# Patient Record
Sex: FEMALE | Race: ASIAN | NOT HISPANIC OR LATINO | Employment: FULL TIME | URBAN - METROPOLITAN AREA
[De-identification: names, ages, dates, MRNs, and addresses within clinical notes are randomized per-mention and may not be internally consistent; named-entity substitution may affect disease eponyms.]

---

## 2017-01-06 DIAGNOSIS — R73.9 HYPERGLYCEMIA: ICD-10-CM

## 2017-01-06 DIAGNOSIS — E78.5 DYSLIPIDEMIA: ICD-10-CM

## 2017-01-06 DIAGNOSIS — R53.83 OTHER FATIGUE: ICD-10-CM

## 2017-02-02 ENCOUNTER — HOSPITAL ENCOUNTER (OUTPATIENT)
Dept: LAB | Facility: MEDICAL CENTER | Age: 47
End: 2017-02-02
Attending: FAMILY MEDICINE
Payer: COMMERCIAL

## 2017-02-02 DIAGNOSIS — E78.5 DYSLIPIDEMIA: ICD-10-CM

## 2017-02-02 DIAGNOSIS — R73.9 HYPERGLYCEMIA: ICD-10-CM

## 2017-02-02 DIAGNOSIS — R53.83 OTHER FATIGUE: ICD-10-CM

## 2017-02-02 LAB
25(OH)D3 SERPL-MCNC: 23 NG/ML (ref 30–100)
ALBUMIN SERPL BCP-MCNC: 4.4 G/DL (ref 3.2–4.9)
ALBUMIN/GLOB SERPL: 1.1 G/DL
ALP SERPL-CCNC: 97 U/L (ref 30–99)
ALT SERPL-CCNC: 49 U/L (ref 2–50)
ANION GAP SERPL CALC-SCNC: 9 MMOL/L (ref 0–11.9)
APPEARANCE UR: CLEAR
AST SERPL-CCNC: 29 U/L (ref 12–45)
BACTERIA #/AREA URNS HPF: ABNORMAL /HPF
BASOPHILS # BLD AUTO: 0.11 K/UL (ref 0–0.12)
BASOPHILS NFR BLD AUTO: 1 % (ref 0–1.8)
BILIRUB SERPL-MCNC: 0.4 MG/DL (ref 0.1–1.5)
BILIRUB UR QL STRIP.AUTO: NEGATIVE
BUN SERPL-MCNC: 10 MG/DL (ref 8–22)
CALCIUM SERPL-MCNC: 9.8 MG/DL (ref 8.5–10.5)
CHLORIDE SERPL-SCNC: 103 MMOL/L (ref 96–112)
CHOLEST SERPL-MCNC: 135 MG/DL (ref 100–199)
CO2 SERPL-SCNC: 24 MMOL/L (ref 20–33)
COLOR UR AUTO: ABNORMAL
CREAT SERPL-MCNC: 0.92 MG/DL (ref 0.5–1.4)
CREAT UR-MCNC: 75.3 MG/DL
CULTURE IF INDICATED INDCX: NO UA CULTURE
EOSINOPHIL # BLD: 0.14 K/UL (ref 0–0.51)
EOSINOPHIL NFR BLD AUTO: 1.2 % (ref 0–6.9)
EPITHELIAL CELLS 1715: ABNORMAL /HPF
ERYTHROCYTE [DISTWIDTH] IN BLOOD BY AUTOMATED COUNT: 40.3 FL (ref 35.9–50)
GLOBULIN SER CALC-MCNC: 3.9 G/DL (ref 1.9–3.5)
GLUCOSE SERPL-MCNC: 177 MG/DL (ref 65–99)
GLUCOSE UR STRIP.AUTO-MCNC: NEGATIVE MG/DL
HCT VFR BLD AUTO: 43 % (ref 37–47)
HDLC SERPL-MCNC: 33 MG/DL
HGB BLD-MCNC: 13 G/DL (ref 12–16)
IMM GRANULOCYTES # BLD AUTO: 0.01 K/UL (ref 0–0.11)
IMM GRANULOCYTES NFR BLD AUTO: 0.1 % (ref 0–0.9)
KETONES UR STRIP.AUTO-MCNC: NEGATIVE MG/DL
LDLC SERPL CALC-MCNC: 82 MG/DL
LEUKOCYTE ESTERASE UR QL STRIP.AUTO: NEGATIVE
LYMPHOCYTES # BLD: 4.27 K/UL (ref 1–4.8)
LYMPHOCYTES NFR BLD AUTO: 37.7 % (ref 22–41)
MCH RBC QN AUTO: 26.3 PG (ref 27–33)
MCHC RBC AUTO-ENTMCNC: 30.2 G/DL (ref 33.6–35)
MCV RBC AUTO: 86.9 FL (ref 81.4–97.8)
MICRO URNS: ABNORMAL
MICROALBUMIN UR-MCNC: <0.7 MG/DL
MICROALBUMIN/CREAT UR: NORMAL MG/G (ref 0–30)
MONOCYTES # BLD: 0.49 K/UL (ref 0–0.85)
MONOCYTES NFR BLD AUTO: 4.3 % (ref 0–13.4)
NEUTROPHILS # BLD: 6.31 K/UL (ref 2–7.15)
NEUTROPHILS NFR BLD AUTO: 55.7 % (ref 44–72)
NITRITE UR QL STRIP.AUTO: NEGATIVE
NRBC # BLD AUTO: 0 K/UL
NRBC BLD-RTO: 0 /100 WBC
PH UR: 6 [PH]
PLATELET # BLD AUTO: 279 K/UL (ref 164–446)
PMV BLD AUTO: 12.3 FL (ref 9–12.9)
POTASSIUM SERPL-SCNC: 4.1 MMOL/L (ref 3.6–5.5)
PROT SERPL-MCNC: 8.3 G/DL (ref 6–8.2)
PROT UR QL STRIP: NEGATIVE MG/DL
RBC # BLD AUTO: 4.95 M/UL (ref 4.2–5.4)
RBC #/AREA URNS HPF: ABNORMAL /HPF
RBC UR QL AUTO: ABNORMAL
SODIUM SERPL-SCNC: 136 MMOL/L (ref 135–145)
SP GR UR STRIP.AUTO: 1.01
T4 FREE SERPL-MCNC: 0.96 NG/DL (ref 0.53–1.43)
TRIGL SERPL-MCNC: 98 MG/DL (ref 0–149)
TSH SERPL DL<=0.005 MIU/L-ACNC: 1.37 UIU/ML (ref 0.3–3.7)
WBC # BLD AUTO: 11.3 K/UL (ref 4.8–10.8)
WBC #/AREA URNS HPF: ABNORMAL /HPF

## 2017-02-02 PROCEDURE — 81001 URINALYSIS AUTO W/SCOPE: CPT

## 2017-02-02 PROCEDURE — 36415 COLL VENOUS BLD VENIPUNCTURE: CPT

## 2017-02-02 PROCEDURE — 80061 LIPID PANEL: CPT

## 2017-02-02 PROCEDURE — 82570 ASSAY OF URINE CREATININE: CPT

## 2017-02-02 PROCEDURE — 84443 ASSAY THYROID STIM HORMONE: CPT

## 2017-02-02 PROCEDURE — 82306 VITAMIN D 25 HYDROXY: CPT

## 2017-02-02 PROCEDURE — 82043 UR ALBUMIN QUANTITATIVE: CPT

## 2017-02-02 PROCEDURE — 84439 ASSAY OF FREE THYROXINE: CPT

## 2017-02-02 PROCEDURE — 80053 COMPREHEN METABOLIC PANEL: CPT

## 2017-02-02 PROCEDURE — 85025 COMPLETE CBC W/AUTO DIFF WBC: CPT

## 2017-02-08 ENCOUNTER — OFFICE VISIT (OUTPATIENT)
Dept: MEDICAL GROUP | Facility: PHYSICIAN GROUP | Age: 47
End: 2017-02-08
Payer: COMMERCIAL

## 2017-02-08 VITALS
DIASTOLIC BLOOD PRESSURE: 88 MMHG | RESPIRATION RATE: 14 BRPM | BODY MASS INDEX: 30.21 KG/M2 | SYSTOLIC BLOOD PRESSURE: 132 MMHG | HEIGHT: 61 IN | WEIGHT: 160 LBS | HEART RATE: 97 BPM | OXYGEN SATURATION: 99 % | TEMPERATURE: 99 F

## 2017-02-08 DIAGNOSIS — E11.9 DIABETES MELLITUS WITHOUT COMPLICATION (HCC): ICD-10-CM

## 2017-02-08 DIAGNOSIS — Z23 NEEDS FLU SHOT: ICD-10-CM

## 2017-02-08 DIAGNOSIS — J30.1 SEASONAL ALLERGIC RHINITIS DUE TO POLLEN: ICD-10-CM

## 2017-02-08 DIAGNOSIS — E66.9 OBESITY (BMI 30.0-34.9): ICD-10-CM

## 2017-02-08 DIAGNOSIS — E78.5 DYSLIPIDEMIA: ICD-10-CM

## 2017-02-08 PROCEDURE — 99396 PREV VISIT EST AGE 40-64: CPT | Mod: 25 | Performed by: FAMILY MEDICINE

## 2017-02-08 PROCEDURE — 90686 IIV4 VACC NO PRSV 0.5 ML IM: CPT | Performed by: FAMILY MEDICINE

## 2017-02-08 PROCEDURE — 90471 IMMUNIZATION ADMIN: CPT | Performed by: FAMILY MEDICINE

## 2017-02-08 ASSESSMENT — PATIENT HEALTH QUESTIONNAIRE - PHQ9: CLINICAL INTERPRETATION OF PHQ2 SCORE: 0

## 2017-02-08 NOTE — PROGRESS NOTES
Patient comes in to review her recent lab work. She says she is feeling tired.  She is due for flu shot.  She denies chest pains or shortness of breath.    I reviewed the following    Past Medical History   Diagnosis Date   • GDM (gestational diabetes mellitus)    • Diabetes mellitus, type 2 (CMS-HCC) 3/14        History reviewed. No pertinent past surgical history.    No Known Allergies    Current Outpatient Prescriptions   Medication Sig Dispense Refill   • metformin (GLUCOPHAGE) 500 MG Tab Take 1 Tab by mouth 2 times a day, with meals. 60 Tab 3   • atorvastatin (LIPITOR) 10 MG Tab TAKE ONE TABLET BY MOUTH ONE TIME DAILY 90 Tab 1   • etodolac (LODINE) 400 MG tablet Take 1 Tab by mouth 2 times a day. Take with food for menstrual cramps 30 Tab 3     No current facility-administered medications for this visit.        Family History   Problem Relation Age of Onset   • Diabetes Mother    • Diabetes Father    • Heart Disease Father    • Hyperlipidemia Father    • Stroke Father    • Hypertension Father    • Diabetes Brother    • Diabetes Brother    • Thyroid Sister        Social History     Social History   • Marital Status:      Spouse Name: N/A   • Number of Children: 3   • Years of Education: HS     Occupational History   •  [employer]     Social History Main Topics   • Smoking status: Never Smoker    • Smokeless tobacco: Never Used   • Alcohol Use: No   • Drug Use: No   • Sexual Activity:     Partners: Male     Birth Control/ Protection: Surgical      Comment:  with vasectomy     Other Topics Concern   • Not on file     Social History Narrative          Hospital Outpatient Visit on 02/02/2017   Component Date Value   • TSH 02/02/2017 1.370    • Free T-4 02/02/2017 0.96    • Micro Urine Req 02/02/2017 Microscopic    • Color 02/02/2017 Lt. Yellow    • Character 02/02/2017 Clear    • Specific Gravity 02/02/2017 1.009    • Ph 02/02/2017 6.0    • Glucose 02/02/2017 Negative    • Ketones  02/02/2017 Negative    • Protein 02/02/2017 Negative    • Bilirubin 02/02/2017 Negative    • Nitrite 02/02/2017 Negative    • Leukocyte Esterase 02/02/2017 Negative    • Occult Blood 02/02/2017 Trace*   • Culture Indicated 02/02/2017 No    • WBC 02/02/2017 11.3*   • RBC 02/02/2017 4.95    • Hemoglobin 02/02/2017 13.0    • Hematocrit 02/02/2017 43.0    • MCV 02/02/2017 86.9    • MCH 02/02/2017 26.3*   • MCHC 02/02/2017 30.2*   • RDW 02/02/2017 40.3    • Platelet Count 02/02/2017 279    • MPV 02/02/2017 12.3    • Neutrophils-Polys 02/02/2017 55.70    • Lymphocytes 02/02/2017 37.70    • Monocytes 02/02/2017 4.30    • Eosinophils 02/02/2017 1.20    • Basophils 02/02/2017 1.00    • Immature Granulocytes 02/02/2017 0.10    • Nucleated RBC 02/02/2017 0.00    • Neutrophils (Absolute) 02/02/2017 6.31    • Lymphs (Absolute) 02/02/2017 4.27    • Monos (Absolute) 02/02/2017 0.49    • Eos (Absolute) 02/02/2017 0.14    • Baso (Absolute) 02/02/2017 0.11    • Immature Granulocytes (a* 02/02/2017 0.01    • NRBC (Absolute) 02/02/2017 0.00    • Sodium 02/02/2017 136    • Potassium 02/02/2017 4.1    • Chloride 02/02/2017 103    • Co2 02/02/2017 24    • Anion Gap 02/02/2017 9.0    • Glucose 02/02/2017 177*   • Bun 02/02/2017 10    • Creatinine 02/02/2017 0.92    • Calcium 02/02/2017 9.8    • AST(SGOT) 02/02/2017 29    • ALT(SGPT) 02/02/2017 49    • Alkaline Phosphatase 02/02/2017 97    • Total Bilirubin 02/02/2017 0.4    • Albumin 02/02/2017 4.4    • Total Protein 02/02/2017 8.3*   • Globulin 02/02/2017 3.9*   • A-G Ratio 02/02/2017 1.1    • Cholesterol,Tot 02/02/2017 135    • Triglycerides 02/02/2017 98    • HDL 02/02/2017 33*   • LDL 02/02/2017 82    • 25-Hydroxy   Vitamin D 25 02/02/2017 23*   • Creatinine, Urine 02/02/2017 75.30    • Microalbumin, Urine Rand* 02/02/2017 <0.7    • Micro Alb Creat Ratio 02/02/2017 see below    • WBC 02/02/2017 0-2    • RBC 02/02/2017 0-2    • Bacteria 02/02/2017 Few*   • Epithelial Cells 02/02/2017  Few    • GFR If  02/02/2017 >60    • GFR If Non  Ameri* 02/02/2017 >60      Physical Exam   Constitutional: She is oriented. She appears well-developed and obese. No distress.   HENT:  Head: Normocephalic and atraumatic.   Right Ear: External ear normal. Ear canal and TM normal   Left Ear: External ear normal. Ear canal and TM normal  Nose: Nose normal.   Mouth/Throat: Oropharynx is clear and moist.   Eyes: Conjunctivae and extraocular motions are normal. Pupils are equal, round, and reactive to light. Fundi benign bilaterally   Neck: No thyromegaly present.   Cardiovascular: Normal rate, regular rhythm, normal heart sounds and intact distal pulses.  Exam reveals no gallop.    No murmur heard.  Pulmonary/Chest: Effort normal and breath sounds normal. No respiratory distress. She has no wheezes. She has no rales.   Abdominal: Soft. Bowel sounds are normal. No hepatosplenomegaly She exhibits no distension. No tenderness. She has no rebound and no guarding.   Musculoskeletal: Normal range of motion. She exhibits no edema and no tenderness.   Lymphadenopathy:     She has no cervical adenopathy.   No supraclavicular adenopathy  Neurological: She is alert and oriented. She has normal reflexes.        Babinskis downgoing bilaterally --The patient has intact sensation to monofilament light touch over both feet. No sores or ulcers are noted over the feet.    Skin: Skin is warm and dry. No rash noted. No erythema.   Psychiatric: She has a normal mood and appropriate affect. Her behavior is normal. Judgment and thought content normal.    1. Diabetes mellitus without complication (CMS-[facility])  metformin (GLUCOPHAGE) 500 MG Tab--begin one by mouth twice a day with food   I recommended that the patient go to diabetes education but she says she has been through that before in the past apparently. She is willing to go to a dietitian, however.     Diabetic Monofilament LE Exam   2. Dyslipidemia   doing well    3.  Seasonal allergic rhinitis due to pollen   quiescent    4. Obesity (BMI 30.0-34.9)  REFERRAL TO [facility] IMPROVEMENT PROGRAMS (HIP) Services Requested:: Registered Dietitian for Medical Nutrition Therapy; Reason for Visit:: Overweight/Obesity   5. Needs flu shot  INFLUENZA VACCINE QUAD INJ >3Y(PF)     Recheck with me one month    Please note that this dictation was created using voice recognition software. I have worked with consultants from the vendor as well as technical experts from [facility] to optimize the interface. I have made every reasonable attempt to correct obvious errors, but I expect that there are errors of grammar and possibly content that I did not discover before finalizing the note.

## 2017-02-08 NOTE — PATIENT INSTRUCTIONS
Patient given written instructions regarding labs, medications, referrals, dietary and lifestyle management, and return visit.    Wil Rich MD

## 2017-02-08 NOTE — MR AVS SNAPSHOT
"Ayaka Torres Rai   2017 9:00 AM   Office Visit   MRN: [id number]    Department:  [facility]   Dept Phone:  [phone]    Description:  Female : 1970   Provider:  Wil Rich M.D.           Reason for Visit     Annual Exam           Allergies as of 2017     No Known Allergies      You were diagnosed with     Diabetes mellitus without complication (CMS-[facility])   [408213]       Dyslipidemia   [551026]       Seasonal allergic rhinitis due to pollen   [8887102]       Obesity (BMI 30.0-34.9)   [180670]       Needs flu shot   [667961]         Vital Signs     Blood Pressure Pulse Temperature Respirations Height Weight    132/88 mmHg 97 37.2 °C (99 °F) 14 1.549 m (5' 0.98\") 72.576 kg (160 lb)    Body Mass Index Oxygen Saturation Smoking Status             30.25 kg/m2 99% Never Smoker          Basic Information     Date Of Birth Sex Race Ethnicity Preferred Language    1970 Female  Non- English      Problem List              ICD-10-CM Priority Class Noted - Resolved    Diabetes mellitus, type 2 (CMS-HCC) E11.9   Unknown - Present    Dyslipidemia E78.5   3/12/2014 - Present    Allergic rhinitis due to allergen J30.9   3/12/2014 - Present      Health Maintenance        Date Due Completion Dates    IMM HEP B VACCINE (1 of 3 - Primary Series) 1970 ---    RETINAL SCREENING 1988 ---    A1C SCREENING 2015, 3/5/2014, 2010    MAMMOGRAM 2016 (Declined), 12/15/2010    Override on 2015: Patient Declined    DIABETES MONOFILAMENT / LE EXAM 2016 (Done), 3/12/2014 (Done)    Override on 2015: Done    Override on 3/12/2014: Done    IMM INFLUENZA (1) 2016, 2015, 10/1/2010    IMM DTaP/Tdap/Td Vaccine (2 - Td) 2017    PAP SMEAR 2018, 2015 (Done), 2010    Override on 2015: Done    FASTING LIPID PROFILE 2018, 2015, 3/4/2014, 2010    URINE ACR / " MICROALBUMIN 2/2/2018 2/2/2017, 1/13/2015    SERUM CREATININE 2/2/2018 2/2/2017, 1/13/2015, 3/4/2014, 12/2/2010, 7/13/2006, 6/29/2005, 6/28/2005            Current Immunizations     Influenza Vaccine Pediatric 10/1/2010    Influenza Vaccine Quad Inj (Pf)  Incomplete    Influenza Vaccine Quad Inj (Preserved) 9/24/2015, 1/26/2015    Pneumococcal polysaccharide vaccine (PPSV-23) 1/26/2015    Tdap Vaccine 1/1/2007      Below and/or attached are the medications your provider expects you to take. Review all of your home medications and newly ordered medications with your provider and/or pharmacist. Follow medication instructions as directed by your provider and/or pharmacist. Please keep your medication list with you and share with your provider. Update the information when medications are discontinued, doses are changed, or new medications (including over-the-counter products) are added; and carry medication information at all times in the event of emergency situations     Allergies:  No Known Allergies          Medications  Valid as of: February 08, 2017 - 10:00 AM    Generic Name Brand Name Tablet Size Instructions for use    Atorvastatin Calcium (Tab) LIPITOR 10 MG TAKE ONE TABLET BY MOUTH ONE TIME DAILY        Etodolac (Tab) LODINE 400 MG Take 1 Tab by mouth 2 times a day. Take with food for menstrual cramps        MetFORMIN HCl (Tab) GLUCOPHAGE 500 MG Take 1 Tab by mouth 2 times a day, with meals.        .                 Medicines prescribed today were sent to:     [place] [address] [place] [id number] [facility] [address]    Phone: [phone] Fax: [phone]    Open 24 Hours?: No      Medication refill instructions:       If your prescription bottle indicates you have medication refills left, it is not necessary to call your provider’s office. Please contact your pharmacy and they will refill your medication.    If your prescription bottle indicates you do not have any refills  left, you may request refills at any time through one of the following ways: The online crowdSPRING system (except Urgent Care), by calling your provider’s office, or by asking your pharmacy to contact your provider’s office with a refill request. Medication refills are processed only during regular business hours and may not be available until the next business day. Your provider may request additional information or to have a follow-up visit with you prior to refilling your medication.   *Please Note: Medication refills are assigned a new Rx number when refilled electronically. Your pharmacy may indicate that no refills were authorized even though a new prescription for the same medication is available at the pharmacy. Please request the medicine by name with the pharmacy before contacting your provider for a refill.        Referral     A referral request has been sent to our patient care coordination department. Please allow 3-5 business days for us to process this request and contact you either by phone or mail. If you do not hear from us by the 5th business day, please call us at [phone].           crowdSPRING Access Code: [email or web address]  Expires: 3/10/2017 10:00 AM    crowdSPRING  A secure, online tool to manage your health information     [employer]’s crowdSPRING® is a secure, online tool that connects you to your personalized health information from the privacy of your home -- day or night - making it very easy for you to manage your healthcare. Once the activation process is completed, you can even access your medical information using the crowdSPRING petra, which is available for free in the Apple Petra store or Google Play store.     crowdSPRING provides the following levels of access (as shown below):   My Chart Features   [facility] Doctor [facility]  Specialists [facility]  Urgent  Care Non-[facility]  Primary Care  Doctor   Email your healthcare team securely and privately 24/7 X X X    Manage appointments: schedule  your next appointment; view details of past/upcoming appointments X      Request prescription refills. X      View recent personal medical records, including lab and immunizations X X X X   View health record, including health history, allergies, medications X X X X   Read reports about your outpatient visits, procedures, consult and ER notes X X X X   See your discharge summary, which is a recap of your hospital and/or ER visit that includes your diagnosis, lab results, and care plan. X X       How to register for 2nd Watch:  1. Go to  [email or web address].  2. Click on the Sign Up Now box, which takes you to the New Member Sign Up page. You will need to provide the following information:  a. Enter your 2nd Watch Access Code exactly as it appears at the top of this page. (You will not need to use this code after you’ve completed the sign-up process. If you do not sign up before the expiration date, you must request a new code.)   b. Enter your date of birth.   c. Enter your home email address.   d. Click Submit, and follow the next screen’s instructions.  3. Create a 2nd Watch ID. This will be your 2nd Watch login ID and cannot be changed, so think of one that is secure and easy to remember.  4. Create a 2nd Watch password. You can change your password at any time.  5. Enter your Password Reset Question and Answer. This can be used at a later time if you forget your password.   6. Enter your e-mail address. This allows you to receive e-mail notifications when new information is available in 2nd Watch.  7. Click Sign Up. You can now view your health information.    For assistance activating your 2nd Watch account, call [phone]

## 2017-07-31 RX ORDER — ATORVASTATIN CALCIUM 10 MG/1
TABLET, FILM COATED ORAL
Qty: 90 TAB | Refills: 3 | Status: SHIPPED | OUTPATIENT
Start: 2017-07-31 | End: 2018-08-26 | Stop reason: SDUPTHER

## 2017-08-14 ENCOUNTER — HOSPITAL ENCOUNTER (OUTPATIENT)
Dept: LAB | Facility: MEDICAL CENTER | Age: 47
End: 2017-08-14
Attending: FAMILY MEDICINE
Payer: COMMERCIAL

## 2017-08-14 DIAGNOSIS — E11.9 DIABETES MELLITUS WITHOUT COMPLICATION (HCC): ICD-10-CM

## 2017-08-14 DIAGNOSIS — E78.5 DYSLIPIDEMIA: ICD-10-CM

## 2017-08-14 DIAGNOSIS — R73.9 HYPERGLYCEMIA: ICD-10-CM

## 2017-08-14 LAB
EST. AVERAGE GLUCOSE BLD GHB EST-MCNC: 146 MG/DL
HBA1C MFR BLD: 6.7 % (ref 0–5.6)

## 2017-08-14 PROCEDURE — 85025 COMPLETE CBC W/AUTO DIFF WBC: CPT

## 2017-08-14 PROCEDURE — 83036 HEMOGLOBIN GLYCOSYLATED A1C: CPT

## 2017-08-14 PROCEDURE — 80061 LIPID PANEL: CPT

## 2017-08-14 PROCEDURE — 80053 COMPREHEN METABOLIC PANEL: CPT

## 2017-08-14 PROCEDURE — 36415 COLL VENOUS BLD VENIPUNCTURE: CPT

## 2017-08-15 ENCOUNTER — OFFICE VISIT (OUTPATIENT)
Dept: MEDICAL GROUP | Facility: PHYSICIAN GROUP | Age: 47
End: 2017-08-15
Payer: COMMERCIAL

## 2017-08-15 VITALS
SYSTOLIC BLOOD PRESSURE: 104 MMHG | DIASTOLIC BLOOD PRESSURE: 58 MMHG | HEIGHT: 61 IN | OXYGEN SATURATION: 98 % | TEMPERATURE: 97.7 F | WEIGHT: 150 LBS | HEART RATE: 94 BPM | BODY MASS INDEX: 28.32 KG/M2

## 2017-08-15 DIAGNOSIS — E78.5 DYSLIPIDEMIA: ICD-10-CM

## 2017-08-15 DIAGNOSIS — E11.9 DIABETES MELLITUS WITHOUT COMPLICATION (HCC): ICD-10-CM

## 2017-08-15 LAB
ALBUMIN SERPL BCP-MCNC: 4.3 G/DL (ref 3.2–4.9)
ALBUMIN/GLOB SERPL: 1.1 G/DL
ALP SERPL-CCNC: 84 U/L (ref 30–99)
ALT SERPL-CCNC: 26 U/L (ref 2–50)
ANION GAP SERPL CALC-SCNC: 11 MMOL/L (ref 0–11.9)
AST SERPL-CCNC: 29 U/L (ref 12–45)
BASOPHILS # BLD AUTO: 0.8 % (ref 0–1.8)
BASOPHILS # BLD: 0.1 K/UL (ref 0–0.12)
BILIRUB SERPL-MCNC: 0.5 MG/DL (ref 0.1–1.5)
BUN SERPL-MCNC: 13 MG/DL (ref 8–22)
CALCIUM SERPL-MCNC: 10.3 MG/DL (ref 8.5–10.5)
CHLORIDE SERPL-SCNC: 102 MMOL/L (ref 96–112)
CHOLEST SERPL-MCNC: 145 MG/DL (ref 100–199)
CO2 SERPL-SCNC: 24 MMOL/L (ref 20–33)
CREAT SERPL-MCNC: 0.79 MG/DL (ref 0.5–1.4)
EOSINOPHIL # BLD AUTO: 0.18 K/UL (ref 0–0.51)
EOSINOPHIL NFR BLD: 1.4 % (ref 0–6.9)
ERYTHROCYTE [DISTWIDTH] IN BLOOD BY AUTOMATED COUNT: 48.9 FL (ref 35.9–50)
GFR SERPL CREATININE-BSD FRML MDRD: >60 ML/MIN/1.73 M 2
GLOBULIN SER CALC-MCNC: 4 G/DL (ref 1.9–3.5)
GLUCOSE SERPL-MCNC: 59 MG/DL (ref 65–99)
HCT VFR BLD AUTO: 46.3 % (ref 37–47)
HDLC SERPL-MCNC: 41 MG/DL
HGB BLD-MCNC: 14.1 G/DL (ref 12–16)
IMM GRANULOCYTES # BLD AUTO: 0.03 K/UL (ref 0–0.11)
IMM GRANULOCYTES NFR BLD AUTO: 0.2 % (ref 0–0.9)
LDLC SERPL CALC-MCNC: 78 MG/DL
LYMPHOCYTES # BLD AUTO: 4.88 K/UL (ref 1–4.8)
LYMPHOCYTES NFR BLD: 36.9 % (ref 22–41)
MCH RBC QN AUTO: 28.3 PG (ref 27–33)
MCHC RBC AUTO-ENTMCNC: 30.5 G/DL (ref 33.6–35)
MCV RBC AUTO: 92.8 FL (ref 81.4–97.8)
MONOCYTES # BLD AUTO: 0.65 K/UL (ref 0–0.85)
MONOCYTES NFR BLD AUTO: 4.9 % (ref 0–13.4)
NEUTROPHILS # BLD AUTO: 7.4 K/UL (ref 2–7.15)
NEUTROPHILS NFR BLD: 55.8 % (ref 44–72)
NRBC # BLD AUTO: 0 K/UL
NRBC BLD AUTO-RTO: 0 /100 WBC
PLATELET # BLD AUTO: 336 K/UL (ref 164–446)
PMV BLD AUTO: 12.5 FL (ref 9–12.9)
POTASSIUM SERPL-SCNC: 4.2 MMOL/L (ref 3.6–5.5)
PROT SERPL-MCNC: 8.3 G/DL (ref 6–8.2)
RBC # BLD AUTO: 4.99 M/UL (ref 4.2–5.4)
SODIUM SERPL-SCNC: 137 MMOL/L (ref 135–145)
TRIGL SERPL-MCNC: 128 MG/DL (ref 0–149)
WBC # BLD AUTO: 13.2 K/UL (ref 4.8–10.8)

## 2017-08-15 PROCEDURE — 99214 OFFICE O/P EST MOD 30 MIN: CPT | Performed by: FAMILY MEDICINE

## 2017-08-16 NOTE — PROGRESS NOTES
Patient comes in to review her recent labs. She says she feels shaky sometimes in the late day when she has been walking a lot. 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 She has consequently lost 10 pounds.  She has no chest pains or shortness of breath.    I reviewed the following    Past Medical History   Diagnosis Date   • GDM (gestational diabetes mellitus)    • Diabetes mellitus, type 2 (CMS-[facility]) 3/14        History reviewed. No pertinent past surgical history.    No Known Allergies    Current Outpatient Prescriptions   Medication Sig Dispense Refill   • metformin (GLUCOPHAGE) 500 MG Tab TAKE 1/2 TABLET BY MOUTH AT NIGHT WITH FOOD 90 Tab 3   • Blood Glucose Monitoring Suppl Device Meter: Dispense One Touch Glucose Meter and all necessary supplies. Sig. Use daily for blood sugar monitoring. #1. NR. 1 Device 0   • atorvastatin (LIPITOR) 10 MG Tab TAKE ONE TABLET BY MOUTH ONE TIME DAILY 90 Tab 3   • etodolac (LODINE) 400 MG tablet Take 1 Tab by mouth 2 times a day. Take with food for menstrual cramps (Patient not taking: Reported on 8/15/2017) 30 Tab 3     No current facility-administered medications for this visit.        Family History   Problem Relation Age of Onset   • Diabetes Mother    • Diabetes Father    • Heart Disease Father    • Hyperlipidemia Father    • Stroke Father    • Hypertension Father    • Diabetes Brother    • Diabetes Brother    • Thyroid Sister        Social History     Social History   • Marital Status:      Spouse Name: N/A   • Number of Children: 3   • Years of Education: HS     Occupational History   •  [employer]     Social History Main Topics   • Smoking status: Never Smoker    • Smokeless tobacco: Never Used   • Alcohol Use: No   • Drug Use: No   • Sexual Activity:     Partners: Male     Birth Control/ Protection: Surgical      Comment:  with vasectomy     Other Topics Concern   • Not on file      Social History Narrative          Hospital Outpatient Visit on 08/14/2017   Component Date Value   • Glycohemoglobin 08/14/2017 6.7*   • Est Avg Glucose 08/14/2017 146    • Sodium 08/14/2017 137    • Potassium 08/14/2017 4.2    • Chloride 08/14/2017 102    • Co2 08/14/2017 24    • Anion Gap 08/14/2017 11.0    • Glucose 08/14/2017 59*   • Bun 08/14/2017 13    • Creatinine 08/14/2017 0.79    • Calcium 08/14/2017 10.3    • AST(SGOT) 08/14/2017 29    • ALT(SGPT) 08/14/2017 26    • Alkaline Phosphatase 08/14/2017 84    • Total Bilirubin 08/14/2017 0.5    • Albumin 08/14/2017 4.3    • Total Protein 08/14/2017 8.3*   • Globulin 08/14/2017 4.0*   • A-G Ratio 08/14/2017 1.1    • Cholesterol,Tot 08/14/2017 145    • Triglycerides 08/14/2017 128    • HDL 08/14/2017 41    • LDL 08/14/2017 78    • WBC 08/14/2017 13.2*   • RBC 08/14/2017 4.99    • Hemoglobin 08/14/2017 14.1    • Hematocrit 08/14/2017 46.3    • MCV 08/14/2017 92.8    • MCH 08/14/2017 28.3    • MCHC 08/14/2017 30.5*   • RDW 08/14/2017 48.9    • Platelet Count 08/14/2017 336    • MPV 08/14/2017 12.5    • Neutrophils-Polys 08/14/2017 55.80    • Lymphocytes 08/14/2017 36.90    • Monocytes 08/14/2017 4.90    • Eosinophils 08/14/2017 1.40    • Basophils 08/14/2017 0.80    • Immature Granulocytes 08/14/2017 0.20    • Nucleated RBC 08/14/2017 0.00    • Neutrophils (Absolute) 08/14/2017 7.40*   • Lymphs (Absolute) 08/14/2017 4.88*   • Monos (Absolute) 08/14/2017 0.65    • Eos (Absolute) 08/14/2017 0.18    • Baso (Absolute) 08/14/2017 0.10    • Immature Granulocytes (a* 08/14/2017 0.03    • NRBC (Absolute) 08/14/2017 0.00    • GFR If  08/14/2017 >60    • GFR If Non  Ameri* 08/14/2017 >60      Physical Exam   Constitutional: She is oriented. She appears well-developed and well-nourished. No distress.   HENT:  Head: Normocephalic and atraumatic.   Right Ear: External ear normal. Ear canal and TM normal   Left Ear: External ear normal. Ear canal and  TM normal  Nose: Nose normal.   Mouth/Throat: Oropharynx is clear and moist.   Eyes: Conjunctivae and extraocular motions are normal. Pupils are equal, round, and reactive to light. Fundi benign bilaterally   Neck: No thyromegaly present.   Cardiovascular: Normal rate, regular rhythm, normal heart sounds and intact distal pulses.  Exam reveals no gallop.    No murmur heard.  Pulmonary/Chest: Effort normal and breath sounds normal. No respiratory distress. She has no wheezes. She has no rales.   Abdominal: Soft. Bowel sounds are normal. No hepatosplenomegaly She exhibits no distension. No tenderness. She has no rebound and no guarding.   Musculoskeletal: Normal range of motion. She exhibits no edema and no tenderness.   Lymphadenopathy:     She has no cervical adenopathy.   No supraclavicular adenopathy  Neurological: She is alert and oriented. She has normal reflexes.        Babinskis downgoing bilaterally --The patient has intact sensation to monofilament light touch over both feet. No sores or ulcers are noted over the feet.    Skin: Skin is warm and dry. No rash noted. No erythema.   Psychiatric: She has a normal mood and appropriate affect. Her behavior is normal. Judgment and thought content normal.     1. Diabetes mellitus without complication (CMS-HCC) --control is a bit too tight especially since she has lost weight  metformin (GLUCOPHAGE) 500 MG Tab--decreased dose to one half tab at dinner time from her prior dose of one tab at dinner time     Blood Glucose Monitoring Suppl Device--glucose meter with all supplies     REFERRAL TO OPHTHALMOLOGY--Dr. Garduno     COMP METABOLIC PANEL    LIPID PROFILE    MICROALBUMIN CREAT RATIO URINE    HEMOGLOBIN A1C   2. Dyslipidemia  COMP METABOLIC PANEL    LIPID PROFILE     Recheck 2 months or when necessary    Please note that this dictation was created using voice recognition software. I have worked with consultants from the vendor as well as technical experts from  [facility] to optimize the interface. I have made every reasonable attempt to correct obvious errors, but I expect that there are errors of grammar and possibly content that I did not discover before finalizing the note.

## 2018-05-14 DIAGNOSIS — E11.9 DIABETES MELLITUS WITHOUT COMPLICATION (HCC): ICD-10-CM

## 2018-11-27 RX ORDER — ATORVASTATIN CALCIUM 10 MG/1
TABLET, FILM COATED ORAL
Qty: 90 TAB | Refills: 1 | Status: SHIPPED | OUTPATIENT
Start: 2018-11-27 | End: 2019-05-27 | Stop reason: SDUPTHER

## 2018-11-27 NOTE — TELEPHONE ENCOUNTER
Was the patient seen in the last year in this department? No 08/15/17    Does patient have an active prescription for medications requested? No     Received Request Via: Pharmacy

## 2019-02-11 ENCOUNTER — OFFICE VISIT (OUTPATIENT)
Dept: MEDICAL GROUP | Facility: PHYSICIAN GROUP | Age: 49
End: 2019-02-11
Payer: COMMERCIAL

## 2019-02-11 VITALS
HEIGHT: 61 IN | BODY MASS INDEX: 30.02 KG/M2 | RESPIRATION RATE: 12 BRPM | WEIGHT: 159 LBS | TEMPERATURE: 97.1 F | OXYGEN SATURATION: 98 % | HEART RATE: 87 BPM | DIASTOLIC BLOOD PRESSURE: 92 MMHG | SYSTOLIC BLOOD PRESSURE: 126 MMHG

## 2019-02-11 DIAGNOSIS — E11.9 TYPE 2 DIABETES MELLITUS WITHOUT COMPLICATION, WITHOUT LONG-TERM CURRENT USE OF INSULIN (HCC): ICD-10-CM

## 2019-02-11 DIAGNOSIS — E78.5 DYSLIPIDEMIA: ICD-10-CM

## 2019-02-11 DIAGNOSIS — E66.9 OBESITY (BMI 30-39.9): ICD-10-CM

## 2019-02-11 LAB
HBA1C MFR BLD: 8.3 % (ref ?–5.8)
INT CON NEG: NEGATIVE
INT CON POS: POSITIVE

## 2019-02-11 PROCEDURE — 92250 FUNDUS PHOTOGRAPHY W/I&R: CPT | Mod: TC | Performed by: NURSE PRACTITIONER

## 2019-02-11 PROCEDURE — 83036 HEMOGLOBIN GLYCOSYLATED A1C: CPT | Performed by: NURSE PRACTITIONER

## 2019-02-11 PROCEDURE — 99214 OFFICE O/P EST MOD 30 MIN: CPT | Performed by: NURSE PRACTITIONER

## 2019-02-11 NOTE — ASSESSMENT & PLAN NOTE
This is a chronic problem for the patient that is uncontrolled.  The patient's weight today is 159 pounds with a BMI of 30.04. The patient states that she has recently started exercising with doing elliptical and high intensity interval training every other day for the last week.  Patient states that her starting weight 1 week ago was 163 pounds and she is down to 159 today.  Patient reports that her diet is vegetarian and she gets her protein from almonds, peanuts, homemade cheese.  Patient states that with dieting she has been skipping meals, but does continue to have large portions.

## 2019-02-11 NOTE — PROGRESS NOTES
"CC:   Chief Complaint   Patient presents with   • Diabetes     diabetes check, tired, readings are high this morning 140        HISTORY OF THE PRESENT ILLNESS: Patient is a 48 y.o. female. This pleasant patient is here today to discuss high blood sugars.    Health Maintenance: Reviewed, patient declines Pap smear, mammogram at this time.  Diabetes health maintenance including retinal screening and hemoglobin A1c completed in clinic today.  Orders for microalbumin creatinine ratio urine, fasting lipid profile, BMP given to patient to complete prior to follow up with PCP.    Diabetes mellitus, type 2  This is a chronic problem for the patient that is uncontrolled.  The patient is presenting today with concerns of her fasting morning blood sugar in the 140-150 range.  Patient states that her previous PCP never prescribed her any antidiabetic medication and stated that she was \"borderline diabetic\".  The patient's next PCP prescribed her metformin, which she has been taking for about 2 years.  For the last 6 months the patient was feeling symptoms of hypoglycemia while working.  She notified her PCP and was advised to decrease her metformin 500 mg nightly to 250 mg nightly.  The patient states that while working she is walking 6-7 miles per day and is not eating for the majority of the day.  The patient has since been taking metformin 250 mg nightly and is concerned that her fasting blood sugars are still elevated.  1 week ago the patient started an exercise program in addition to her 6-7 miles she walks per day for work, which includes every other day elliptical and high intensity interval training.  Patient states that when she is exercising and fasting she is symptomatic for low blood sugar.  The patient has not checked her blood sugar when she has felt the symptoms.  The patient's hemoglobin A1c today is 8.3.    Dyslipidemia  This is a chronic problem for the patient that is controlled with atorvastatin 10 mg/day, " denies any side effects of the medication.  The patient's most recent lipid profile was completed on 8/14/2017.  Lab Results  Component Value Date/Time   CHOLSTRLTOT 145 08/14/2017 0658   TRIGLYCERIDE 128 08/14/2017 0658   HDL 41 08/14/2017 0658   LDL 78 08/14/2017 0658       Obesity (BMI 30-39.9)  This is a chronic problem for the patient that is uncontrolled.  The patient's weight today is 159 pounds with a BMI of 30.04. The patient states that she has recently started exercising with doing elliptical and high intensity interval training every other day for the last week.  Patient states that her starting weight 1 week ago was 163 pounds and she is down to 159 today.  Patient reports that her diet is vegetarian and she gets her protein from almonds, peanuts, homemade cheese.  Patient states that with dieting she has been skipping meals, but does continue to have large portions.    Allergies: Patient has no known allergies.    Current Outpatient Prescriptions Ordered in [facility]   Medication Sig Dispense Refill   • atorvastatin (LIPITOR) 10 MG Tab TAKE 1 TABLET BY MOUTH EVERY DAY 90 Tab 1   • metFORMIN (GLUCOPHAGE) 500 MG Tab TAKE 1/2 TABLET EVERY EVENING WITH FOOD 45 Tab 3   • ONE TOUCH ULTRA TEST strip USE ONE STRIP DAILY FOR BLOOD SUGAR MONITORING 50 Strip 3   • Blood Glucose Monitoring Suppl Device Meter: Dispense One Touch Glucose Meter and all necessary supplies. Sig. Use daily for blood sugar monitoring. #1. NR. 1 Device 0   • etodolac (LODINE) 400 MG tablet Take 1 Tab by mouth 2 times a day. Take with food for menstrual cramps 30 Tab 3     No current Epic-ordered facility-administered medications on file.        Past Medical History:   Diagnosis Date   • Allergic rhinitis due to allergen 3/12/2014   • Diabetes mellitus, type 2 (HCC) 3/14   • GDM (gestational diabetes mellitus)    • Hyperlipidemia        History reviewed. No pertinent surgical history.    Social History   Substance Use Topics   • Smoking status:  Never Smoker   • Smokeless tobacco: Never Used   • Alcohol use No       Social History     Social History Narrative   • No narrative on file       Family History   Problem Relation Age of Onset   • Diabetes Father    • Heart Disease Father    • Hyperlipidemia Father    • Stroke Father    • Hypertension Father    • Diabetes Mother    • Diabetes Brother    • Diabetes Brother    • Thyroid Sister        ROS:   Constitutional: Positive for intermittent sweating, shaking, lightheadedness.  Negative for fever, chills, unexpected weight change, night sweats, body aches, sleep issues,  and fatigue/generalized weakness.   HEENT:  Negative for headaches, vision changes, hearing changes, ear pain, tinnitus, ear discharge, rhinorrhea, sinus congestion, sneezing, sore throat, and neck pain.    Respiratory:  Negative for cough, shortness of breath, sputum production, hemoptysis, chest congestion, dyspnea, wheezing, and crackles.    Cardiovascular:  Negative for chest pain, palpitations, LIND, paroxsymal nocturnal dyspnea, orthopnea, and bilateral lower extremity edema.   Gastrointestinal:  Negative for heartburn, nausea, vomiting, abdominal pain, hematochezia, melena, diarrhea, constipation, and greasy/foul-smelling stools.   Genitourinary:  Negative for dysuria, nocturia, polyuria, hematuria, pyuria, urinary urgency, urinary frequency, and urinary incontinence.   Musculoskeletal:  Negative for myalgias, back pain, and joint pain.   Skin: Positive for abrasion on left knee, patient states slow to heal, but healing.  Negative for rash, lumps, itching, cyanotic skin color change.   Neurological: Positive for intermittent dizziness with assumed hypoglycemia.  Negative for tingling, tremors, focal sensory deficit, focal weakness and headaches.   Endo/Heme/Allergies:  Does not bruise/bleed easily. Denies cold/heat intolerance.   Psychiatric/Behavioral: Negative for depression, suicidal/homicidal ideation and memory loss.        Exam:  "Blood pressure 126/92, pulse 87, temperature 36.2 °C (97.1 °F), resp. rate 12, height 1.549 m (5' 1\"), weight 72.1 kg (159 lb), SpO2 98 %. Body mass index is 30.04 kg/m².    General:  Normal appearing. No distress.  HEENT:  Normocephalic. Eyes conjunctiva clear lids without ptosis, pupils equal and reactive to light accommodation, ears normal shape and contour.   Neck:  Supple without JVD or bruit.   Pulmonary:  Clear to ausculation.  Normal effort. No rales, ronchi, or wheezing.  Cardiovascular:  Regular rate and rhythm without murmur.   Abdomen:  Soft, nontender, nondistended. Normal bowel sounds.   Neurologic:  Grossly nonfocal.  Skin:  Warm and dry.  No obvious lesions.  Musculoskeletal:  Normal gait. No extremity cyanosis, clubbing, or edema.  Psych: Normal mood and affect. Alert and oriented x3. Judgment and insight is normal.    Assessment/Plan  1. Type 2 diabetes mellitus without complication, without long-term current use of insulin ([facility])  POCT A1C - 8.3%  POCT Retinal Eye Exam - completed in clinic    Lipid Profile -to be completed prior to follow-up  COMP METABOLIC PANEL -to be completed prior to follow-up    MICROALBUMIN CREAT RATIO URINE (LAB COLLECT) -to be completed prior to follow-up    REFERRAL TO ENDOCRINOLOGY -patient states that she has received diabetic education in the past.  Referral to endocrinology for management of diabetes as patient is having hypoglycemia symptoms while only taking 250 mg of metformin nightly, and worse symptoms when taking 500 mg nightly.  Plan to have the patient follow-up with diabetes RN in clinic with PCP on 3/18/2019.   2. Dyslipidemia  Lipid Profile -to be completed prior to follow-up  Continue atorvastatin 10 mg nightly   3. Obesity (BMI 30-39.9)  Patient identified as having weight management issue.  Appropriate orders and counseling given.  Recommend that the patient include more protein in her diet as she is a vegetarian.  Also recommended that the patient have " small frequent meals throughout the day, for example 6 small meals instead of 1-2 large meals per day.     Educated in proper administration of medication(s) ordered today including safety, possible SE, risks, benefits, rationale and alternatives to therapy.   Supportive care, differential diagnoses, and indications for immediate follow-up discussed with patient.    Pathogenesis of diagnosis discussed including typical length and natural progression.    Instructed to return to clinic or nearest emergency department for any change in condition, further concerns, or worsening of symptoms.  Patient states understanding of the plan of care and discharge instructions.    Return in about 5 weeks (around 3/18/2019) for Diabetes RN/Provider Combo.    Please note that this dictation was created using voice recognition software. I have made every reasonable attempt to correct obvious errors, but I expect that there are errors of grammar and possibly content that I did not discover before finalizing the note.

## 2019-02-11 NOTE — ASSESSMENT & PLAN NOTE
This is a chronic problem for the patient that is controlled with atorvastatin 10 mg/day, denies any side effects of the medication.  The patient's most recent lipid profile was completed on 8/14/2017.  Lab Results  Component Value Date/Time   CHOLSTRLTOT 145 08/14/2017 0658   TRIGLYCERIDE 128 08/14/2017 0658   HDL 41 08/14/2017 0658   LDL 78 08/14/2017 0658

## 2019-02-11 NOTE — ASSESSMENT & PLAN NOTE
"This is a chronic problem for the patient that is uncontrolled.  The patient is presenting today with concerns of her fasting morning blood sugar in the 140-150 range.  Patient states that her previous PCP never prescribed her any antidiabetic medication and stated that she was \"borderline diabetic\".  The patient's next PCP prescribed her metformin, which she has been taking for about 2 years.  For the last 6 months the patient was feeling symptoms of hypoglycemia while working.  She notified her PCP and was advised to decrease her metformin 500 mg nightly to 250 mg nightly.  The patient states that while working she is walking 6-7 miles per day and is not eating for the majority of the day.  The patient has since been taking metformin 250 mg nightly and is concerned that her fasting blood sugars are still elevated.  1 week ago the patient started an exercise program in addition to her 6-7 miles she walks per day for work, which includes every other day elliptical and high intensity interval training.  Patient states that when she is exercising and fasting she is symptomatic for low blood sugar.  The patient has not checked her blood sugar when she has felt the symptoms.  The patient's hemoglobin A1c today is 8.3.  "

## 2019-02-12 ENCOUNTER — HOSPITAL ENCOUNTER (OUTPATIENT)
Dept: LAB | Facility: MEDICAL CENTER | Age: 49
End: 2019-02-12
Attending: NURSE PRACTITIONER
Payer: COMMERCIAL

## 2019-02-12 DIAGNOSIS — E11.9 TYPE 2 DIABETES MELLITUS WITHOUT COMPLICATION, WITHOUT LONG-TERM CURRENT USE OF INSULIN (HCC): ICD-10-CM

## 2019-02-12 LAB
ALBUMIN SERPL BCP-MCNC: 4.7 G/DL (ref 3.2–4.9)
ALBUMIN/GLOB SERPL: 1.2 G/DL
ALP SERPL-CCNC: 96 U/L (ref 30–99)
ALT SERPL-CCNC: 80 U/L (ref 2–50)
ANION GAP SERPL CALC-SCNC: 10 MMOL/L (ref 0–11.9)
AST SERPL-CCNC: 53 U/L (ref 12–45)
BILIRUB SERPL-MCNC: 0.5 MG/DL (ref 0.1–1.5)
BUN SERPL-MCNC: 10 MG/DL (ref 8–22)
CALCIUM SERPL-MCNC: 10.9 MG/DL (ref 8.5–10.5)
CHLORIDE SERPL-SCNC: 104 MMOL/L (ref 96–112)
CHOLEST SERPL-MCNC: 130 MG/DL (ref 100–199)
CO2 SERPL-SCNC: 25 MMOL/L (ref 20–33)
CREAT SERPL-MCNC: 0.82 MG/DL (ref 0.5–1.4)
CREAT UR-MCNC: 35.2 MG/DL
FASTING STATUS PATIENT QL REPORTED: NORMAL
GLOBULIN SER CALC-MCNC: 3.8 G/DL (ref 1.9–3.5)
GLUCOSE SERPL-MCNC: 168 MG/DL (ref 65–99)
HDLC SERPL-MCNC: 35 MG/DL
LDLC SERPL CALC-MCNC: 74 MG/DL
MICROALBUMIN UR-MCNC: <0.7 MG/DL
MICROALBUMIN/CREAT UR: NORMAL MG/G (ref 0–30)
POTASSIUM SERPL-SCNC: 4.3 MMOL/L (ref 3.6–5.5)
PROT SERPL-MCNC: 8.5 G/DL (ref 6–8.2)
SODIUM SERPL-SCNC: 139 MMOL/L (ref 135–145)
TRIGL SERPL-MCNC: 105 MG/DL (ref 0–149)

## 2019-02-12 PROCEDURE — 82043 UR ALBUMIN QUANTITATIVE: CPT

## 2019-02-12 PROCEDURE — 80053 COMPREHEN METABOLIC PANEL: CPT

## 2019-02-12 PROCEDURE — 36415 COLL VENOUS BLD VENIPUNCTURE: CPT

## 2019-02-12 PROCEDURE — 82570 ASSAY OF URINE CREATININE: CPT

## 2019-02-12 PROCEDURE — 80061 LIPID PANEL: CPT

## 2019-02-19 LAB — RETINAL SCREEN: NEGATIVE

## 2019-04-08 ENCOUNTER — OFFICE VISIT (OUTPATIENT)
Dept: ENDOCRINOLOGY | Facility: MEDICAL CENTER | Age: 49
End: 2019-04-08
Payer: COMMERCIAL

## 2019-04-08 VITALS
DIASTOLIC BLOOD PRESSURE: 78 MMHG | SYSTOLIC BLOOD PRESSURE: 120 MMHG | HEART RATE: 85 BPM | BODY MASS INDEX: 29.07 KG/M2 | WEIGHT: 154 LBS | HEIGHT: 61 IN | OXYGEN SATURATION: 100 %

## 2019-04-08 DIAGNOSIS — E11.00 TYPE 2 DIABETES MELLITUS WITH HYPEROSMOLARITY WITHOUT COMA, WITH LONG-TERM CURRENT USE OF INSULIN (HCC): ICD-10-CM

## 2019-04-08 DIAGNOSIS — Z79.4 TYPE 2 DIABETES MELLITUS WITH HYPEROSMOLARITY WITHOUT COMA, WITH LONG-TERM CURRENT USE OF INSULIN (HCC): ICD-10-CM

## 2019-04-08 DIAGNOSIS — E11.65 UNCONTROLLED TYPE 2 DIABETES MELLITUS WITH HYPERGLYCEMIA (HCC): ICD-10-CM

## 2019-04-08 PROCEDURE — 99203 OFFICE O/P NEW LOW 30 MIN: CPT | Performed by: PHYSICIAN ASSISTANT

## 2019-04-08 NOTE — PROGRESS NOTES
New Patient Consult Note  Referred by: Wil Rich M.D.    Reason for consult: Diabetes Management Type 2    HPI:  Ayaka Torres Rai is a 48 y.o. old patient who is seeing us today for diabetes care.  This is a pleasant patient with diabetes and I appreciate the opportunity to participate in the care of this patient.  This is a new patient with me today.    Labs of  2/11/19 HbA1c is 8.3, GFR >60  Labs of 8/14/18 HbA1c is 6.7    BG Diary:4/8/2019  In the AM: 133, 130, 139, 142, 146, 132, 138, 146    Has been Diabetic since 5 years  Has a Glucagon pen at home: no    1. Type 2 diabetes mellitus with hyperosmolarity without coma, with long-term current use of insulin (HCC)  This is a new patient with me on 4/8/2019  They are on:  1.  Metformin 500mg IR  One in the AM one in the PM          ROS:   Constitutional: No change in weight , No fatigue, No night sweats.  HEENT: No Headache.  Eyes:  No blurred vision, No visual changes.  Cardiac: No chest pain, No palpitations.  Resp: No shortness of breath, No cough,   Gastro: No nausea or vomiting, No diarrhea.  Neuro: Denies numbness or tinging in bilateral feet or hands, and no loss of sensation.  Endo: No heat or cold intolerance.  : No polyuria, No polydipsia, No chronic UTI's.  Lower extremities: No lower leg edema bilateral.  All other systems were reviewed and were negative.    Past Medical History:  Patient Active Problem List    Diagnosis Date Noted   • Uncontrolled type 2 diabetes mellitus with hyperglycemia (HCC) 04/08/2019   • Obesity (BMI 30-39.9) 02/11/2019   • Dyslipidemia 03/12/2014       Past Surgical History:  History reviewed. No pertinent surgical history.    Allergies:  Patient has no known allergies.    Social History:  Social History     Social History   • Marital status:      Spouse name: N/A   • Number of children: 3   • Years of education: HS     Occupational History   •  [employer]     Social History Main  "Topics   • Smoking status: Never Smoker   • Smokeless tobacco: Never Used   • Alcohol use No   • Drug use: No   • Sexual activity: Yes     Partners: Male     Birth control/ protection: Surgical      Comment:  with vasectomy     Other Topics Concern   • Not on file     Social History Narrative   • No narrative on file       Family History:  Family History   Problem Relation Age of Onset   • Diabetes Father    • Heart Disease Father    • Hyperlipidemia Father    • Stroke Father    • Hypertension Father    • Diabetes Mother    • Diabetes Brother    • Diabetes Brother    • Thyroid Sister        Medications:    Current Outpatient Prescriptions:   •  Empagliflozin-Metformin HCl ER  MG TABLET SR 24 HR, Take 1 tablet by mouth 1 time daily as needed., Disp: 30 Tab, Rfl: 11  •  atorvastatin (LIPITOR) 10 MG Tab, TAKE 1 TABLET BY MOUTH EVERY DAY, Disp: 90 Tab, Rfl: 1  •  ONE TOUCH ULTRA TEST strip, USE ONE STRIP DAILY FOR BLOOD SUGAR MONITORING, Disp: 50 Strip, Rfl: 3  •  Blood Glucose Monitoring Suppl Device, Meter: Dispense One Touch Glucose Meter and all necessary supplies. Sig. Use daily for blood sugar monitoring. #1. NR., Disp: 1 Device, Rfl: 0  •  etodolac (LODINE) 400 MG tablet, Take 1 Tab by mouth 2 times a day. Take with food for menstrual cramps, Disp: 30 Tab, Rfl: 3      Physical Examination:   Vital signs: /78 (BP Location: Left arm, Patient Position: Sitting)   Pulse 85   Ht 1.549 m (5' 1\")   Wt 69.9 kg (154 lb)   SpO2 100%   BMI 29.10 kg/m²   General: No distress, cooperative, well dressed and well nourished.   Eyes: No scleral icterus or discharge, No hyposphagma  ENMT: Normal on external inspection of nose, lips, No nasal drainage   Neck: No abnormal masses on inspection  Resp: Normal effort, Bilateral clear to auscultation, No wheezing, No rales  CVS: Regular rate and rhythm, S1 S2 normal, No murmur. No gallop  Extremities: No edema bilateral extremities  Neuro: Alert and " oriented  Skin: No rash, No Ulcers  Psych: Normal mood and affect      Assessment and Plan:    2. Uncontrolled type 2 diabetes mellitus with hyperglycemia (HCC)    1.  Synjardy 1000/25 one pill a day in the morning    Stop Metformin  The metformin alone is not holding her. I will add in an SGLT2      Return in about 2 months (around 6/8/2019).       This patient during there office visit was started on new medication Synjardy.  Side effects of new medications were discussed with the patient today in the office. The patient was supplied paperwork on this new medication.    Thank you kindly for allowing me to participate in the diabetes care plan for this patient.    John Mcfadden PA-C, BC-[facility]  Board Certified - Advanced Diabetes Management  04/08/19    CC:   Wil Rich M.D.

## 2019-06-03 ENCOUNTER — OFFICE VISIT (OUTPATIENT)
Dept: ENDOCRINOLOGY | Facility: MEDICAL CENTER | Age: 49
End: 2019-06-03
Payer: COMMERCIAL

## 2019-06-03 VITALS
HEIGHT: 61 IN | BODY MASS INDEX: 28.32 KG/M2 | OXYGEN SATURATION: 98 % | SYSTOLIC BLOOD PRESSURE: 100 MMHG | WEIGHT: 150 LBS | DIASTOLIC BLOOD PRESSURE: 50 MMHG | HEART RATE: 82 BPM

## 2019-06-03 DIAGNOSIS — Z79.4 TYPE 2 DIABETES MELLITUS WITH HYPEROSMOLARITY WITHOUT COMA, WITH LONG-TERM CURRENT USE OF INSULIN (HCC): ICD-10-CM

## 2019-06-03 DIAGNOSIS — E11.00 TYPE 2 DIABETES MELLITUS WITH HYPEROSMOLARITY WITHOUT COMA, WITH LONG-TERM CURRENT USE OF INSULIN (HCC): ICD-10-CM

## 2019-06-03 DIAGNOSIS — E11.65 UNCONTROLLED TYPE 2 DIABETES MELLITUS WITH HYPERGLYCEMIA (HCC): ICD-10-CM

## 2019-06-03 LAB
HBA1C MFR BLD: 6.3 % (ref 0–5.6)
INT CON NEG: NEGATIVE
INT CON POS: POSITIVE

## 2019-06-03 PROCEDURE — 83036 HEMOGLOBIN GLYCOSYLATED A1C: CPT | Performed by: PHYSICIAN ASSISTANT

## 2019-06-03 PROCEDURE — 99213 OFFICE O/P EST LOW 20 MIN: CPT | Performed by: PHYSICIAN ASSISTANT

## 2019-06-03 NOTE — PROGRESS NOTES
Return to office Patient Consult Note  Referred by: Wil Rich M.D.    Reason for consult: Diabetes Management Type 2    HPI:  Ayaka Torres Rai is a 48 y.o. old patient who is seeing us today for diabetes care.  This is a pleasant patient with diabetes and I appreciate the opportunity to participate in the care of this patient.    Labs of 6/3/2019 HbA1c is 6.3  Labs of  2/11/19 HbA1c is 8.3, GFR >60  Labs of 8/14/18 HbA1c is 6.7    BG Diary:6/3/2019  In the AM: 128, 112, 131, 112, 131, 108, 124, 122    Weight: on 4/8/19 was 154pounds and today  150      1. Uncontrolled type 2 diabetes mellitus with hyperglycemia (HCC)  This is a new patient with me on 4/8/2019  They were on:  1.  Metformin 500mg IR  One in the AM one in the PM    Now on:  1.  Synjardy 1000/25 one pill a day in the morning        ROS:   Constitutional: No night sweats.  Eyes:  No visual changes.  Cardiac: No chest pain, No palpitations or racing heart rate.  Resp: No shortness of breath, No cough,   Gi: No Diarrhea    All other systems were reviewed and were/are negative.      Past Medical History:  Patient Active Problem List    Diagnosis Date Noted   • Uncontrolled type 2 diabetes mellitus with hyperglycemia (HCC) 04/08/2019   • Obesity (BMI 30-39.9) 02/11/2019   • Dyslipidemia 03/12/2014       Past Surgical History:  History reviewed. No pertinent surgical history.    Allergies:  Patient has no known allergies.    Social History:  Social History     Social History   • Marital status:      Spouse name: N/A   • Number of children: 3   • Years of education: HS     Occupational History   •  [employer]     Social History Main Topics   • Smoking status: Never Smoker   • Smokeless tobacco: Never Used   • Alcohol use No   • Drug use: No   • Sexual activity: Yes     Partners: Male     Birth control/ protection: Surgical      Comment:  with vasectomy     Other Topics Concern   • Not on file     Social History  "Narrative   • No narrative on file       Family History:  Family History   Problem Relation Age of Onset   • Diabetes Father    • Heart Disease Father    • Hyperlipidemia Father    • Stroke Father    • Hypertension Father    • Diabetes Mother    • Diabetes Brother    • Diabetes Brother    • Thyroid Sister        Medications:    Current Outpatient Prescriptions:   •  atorvastatin (LIPITOR) 10 MG Tab, TAKE 1 TABLET BY MOUTH EVERY DAY, Disp: 90 Tab, Rfl: 3  •  Empagliflozin-Metformin HCl ER  MG TABLET SR 24 HR, Take 1 tablet by mouth 1 time daily as needed., Disp: 30 Tab, Rfl: 11  •  ONE TOUCH ULTRA TEST strip, USE ONE STRIP DAILY FOR BLOOD SUGAR MONITORING, Disp: 50 Strip, Rfl: 3  •  Blood Glucose Monitoring Suppl Device, Meter: Dispense One Touch Glucose Meter and all necessary supplies. Sig. Use daily for blood sugar monitoring. #1. NR., Disp: 1 Device, Rfl: 0  •  etodolac (LODINE) 400 MG tablet, Take 1 Tab by mouth 2 times a day. Take with food for menstrual cramps, Disp: 30 Tab, Rfl: 3        Physical Examination:   Vital signs: /50 (BP Location: Left arm, Patient Position: Sitting)   Pulse 82   Ht 1.549 m (5' 1\")   Wt 68 kg (150 lb)   SpO2 98%   BMI 28.34 kg/m²   General: No distress, cooperative, well dressed and well nourished.   Eyes: No scleral icterus or discharge, No hyposphagma  ENMT: Normal on external inspection of nose, lips, No nasal drainage   Neck: No abnormal masses on inspection  Resp: Normal effort, Bilateral clear to auscultation, No wheezing, No rales  CVS: Regular rate and rhythm, S1 S2 normal, No murmur. No gallop  Extremities: No edema bilateral extremities  Neuro: Alert and oriented  Skin: No rash, No Ulcers  Psych: Normal mood and affect      Assessment and Plan:    1. Uncontrolled type 2 diabetes mellitus with hyperglycemia (HCC)  Now on:  1.  Synjardy 1000/25 one pill a day in the morning     Return in about 1 year (around 6/3/2020).      Thank you kindly for allowing me " to participate in the diabetes care plan for this patient.    John Mcfadden PA-C, BC-[facility]  Board Certified - Advanced Diabetes Management  06/03/19    CC:   Wil Rich M.D.

## 2020-02-25 ENCOUNTER — TELEPHONE (OUTPATIENT)
Dept: MEDICAL GROUP | Facility: PHYSICIAN GROUP | Age: 50
End: 2020-02-25

## 2020-02-25 DIAGNOSIS — E11.65 UNCONTROLLED TYPE 2 DIABETES MELLITUS WITH HYPERGLYCEMIA (HCC): ICD-10-CM

## 2020-02-25 DIAGNOSIS — E55.9 VITAMIN D DEFICIENCY: ICD-10-CM

## 2020-02-25 DIAGNOSIS — E78.5 DYSLIPIDEMIA: ICD-10-CM

## 2020-02-26 NOTE — TELEPHONE ENCOUNTER
Phone Number Called: [phone] (home)       Call outcome: Spoke to patient regarding message below.    Message: pt informed labs ordered may go to any [facility] lab fasting

## 2020-02-26 NOTE — TELEPHONE ENCOUNTER
VOICEMAIL  1. Caller Name: Gorge GROVER Rai                        Call Back Number: [phone] (home)       2. Message: Pt requesting lab orders be placed for upcoming appt with you       3. Patient approves office to leave a detailed voicemail/MyChart message: N\A

## 2020-05-20 ENCOUNTER — HOSPITAL ENCOUNTER (OUTPATIENT)
Dept: LAB | Facility: MEDICAL CENTER | Age: 50
End: 2020-05-20
Attending: FAMILY MEDICINE
Payer: COMMERCIAL

## 2020-05-20 ENCOUNTER — HOSPITAL ENCOUNTER (OUTPATIENT)
Facility: MEDICAL CENTER | Age: 50
End: 2020-05-20
Payer: COMMERCIAL

## 2020-05-20 DIAGNOSIS — E78.5 DYSLIPIDEMIA: ICD-10-CM

## 2020-05-20 DIAGNOSIS — E11.65 UNCONTROLLED TYPE 2 DIABETES MELLITUS WITH HYPERGLYCEMIA (HCC): ICD-10-CM

## 2020-05-20 LAB
25(OH)D3 SERPL-MCNC: 54 NG/ML (ref 30–100)
ALBUMIN SERPL BCP-MCNC: 4.4 G/DL (ref 3.2–4.9)
ALBUMIN/GLOB SERPL: 1.3 G/DL
ALP SERPL-CCNC: 89 U/L (ref 30–99)
ALT SERPL-CCNC: 29 U/L (ref 2–50)
ANION GAP SERPL CALC-SCNC: 12 MMOL/L (ref 7–16)
APPEARANCE UR: ABNORMAL
AST SERPL-CCNC: 29 U/L (ref 12–45)
BACTERIA #/AREA URNS HPF: ABNORMAL /HPF
BASOPHILS # BLD AUTO: 0.8 % (ref 0–1.8)
BASOPHILS # BLD: 0.08 K/UL (ref 0–0.12)
BILIRUB SERPL-MCNC: 0.5 MG/DL (ref 0.1–1.5)
BILIRUB UR QL STRIP.AUTO: NEGATIVE
BUN SERPL-MCNC: 11 MG/DL (ref 8–22)
CALCIUM SERPL-MCNC: 10.2 MG/DL (ref 8.5–10.5)
CHLORIDE SERPL-SCNC: 104 MMOL/L (ref 96–112)
CHOLEST SERPL-MCNC: 125 MG/DL (ref 100–199)
CO2 SERPL-SCNC: 25 MMOL/L (ref 20–33)
COLOR UR: YELLOW
CREAT SERPL-MCNC: 0.68 MG/DL (ref 0.5–1.4)
CREAT UR-MCNC: 53.48 MG/DL
EOSINOPHIL # BLD AUTO: 0.13 K/UL (ref 0–0.51)
EOSINOPHIL NFR BLD: 1.3 % (ref 0–6.9)
EPI CELLS #/AREA URNS HPF: ABNORMAL /HPF
ERYTHROCYTE [DISTWIDTH] IN BLOOD BY AUTOMATED COUNT: 45.7 FL (ref 35.9–50)
EST. AVERAGE GLUCOSE BLD GHB EST-MCNC: 131 MG/DL
FASTING STATUS PATIENT QL REPORTED: NORMAL
GLOBULIN SER CALC-MCNC: 3.5 G/DL (ref 1.9–3.5)
GLUCOSE SERPL-MCNC: 107 MG/DL (ref 65–99)
GLUCOSE UR STRIP.AUTO-MCNC: >=1000 MG/DL
HBA1C MFR BLD: 6.2 % (ref 0–5.6)
HCT VFR BLD AUTO: 46 % (ref 37–47)
HDLC SERPL-MCNC: 40 MG/DL
HGB BLD-MCNC: 14.3 G/DL (ref 12–16)
HYALINE CASTS #/AREA URNS LPF: ABNORMAL /LPF
IMM GRANULOCYTES # BLD AUTO: 0.03 K/UL (ref 0–0.11)
IMM GRANULOCYTES NFR BLD AUTO: 0.3 % (ref 0–0.9)
KETONES UR STRIP.AUTO-MCNC: NEGATIVE MG/DL
LDLC SERPL CALC-MCNC: 66 MG/DL
LEUKOCYTE ESTERASE UR QL STRIP.AUTO: ABNORMAL
LYMPHOCYTES # BLD AUTO: 3.37 K/UL (ref 1–4.8)
LYMPHOCYTES NFR BLD: 34.2 % (ref 22–41)
MCH RBC QN AUTO: 28.6 PG (ref 27–33)
MCHC RBC AUTO-ENTMCNC: 31.1 G/DL (ref 33.6–35)
MCV RBC AUTO: 92 FL (ref 81.4–97.8)
MICRO URNS: ABNORMAL
MICROALBUMIN UR-MCNC: <1.2 MG/DL
MICROALBUMIN/CREAT UR: NORMAL MG/G (ref 0–30)
MONOCYTES # BLD AUTO: 0.48 K/UL (ref 0–0.85)
MONOCYTES NFR BLD AUTO: 4.9 % (ref 0–13.4)
NEUTROPHILS # BLD AUTO: 5.76 K/UL (ref 2–7.15)
NEUTROPHILS NFR BLD: 58.5 % (ref 44–72)
NITRITE UR QL STRIP.AUTO: NEGATIVE
NRBC # BLD AUTO: 0 K/UL
NRBC BLD-RTO: 0 /100 WBC
PH UR STRIP.AUTO: 5 [PH] (ref 5–8)
PLATELET # BLD AUTO: 264 K/UL (ref 164–446)
PMV BLD AUTO: 12.5 FL (ref 9–12.9)
POTASSIUM SERPL-SCNC: 4 MMOL/L (ref 3.6–5.5)
PROT SERPL-MCNC: 7.9 G/DL (ref 6–8.2)
PROT UR QL STRIP: NEGATIVE MG/DL
RBC # BLD AUTO: 5 M/UL (ref 4.2–5.4)
RBC # URNS HPF: ABNORMAL /HPF
RBC UR QL AUTO: NEGATIVE
SODIUM SERPL-SCNC: 141 MMOL/L (ref 135–145)
SP GR UR STRIP.AUTO: 1.02
TRIGL SERPL-MCNC: 97 MG/DL (ref 0–149)
UROBILINOGEN UR STRIP.AUTO-MCNC: 0.2 MG/DL
WBC # BLD AUTO: 9.9 K/UL (ref 4.8–10.8)
WBC #/AREA URNS HPF: ABNORMAL /HPF

## 2020-05-20 PROCEDURE — 83036 HEMOGLOBIN GLYCOSYLATED A1C: CPT

## 2020-05-20 PROCEDURE — 80061 LIPID PANEL: CPT

## 2020-05-20 PROCEDURE — 82570 ASSAY OF URINE CREATININE: CPT

## 2020-05-20 PROCEDURE — 87086 URINE CULTURE/COLONY COUNT: CPT

## 2020-05-20 PROCEDURE — 85025 COMPLETE CBC W/AUTO DIFF WBC: CPT

## 2020-05-20 PROCEDURE — 80053 COMPREHEN METABOLIC PANEL: CPT

## 2020-05-20 PROCEDURE — 82043 UR ALBUMIN QUANTITATIVE: CPT

## 2020-05-20 PROCEDURE — 81001 URINALYSIS AUTO W/SCOPE: CPT

## 2020-05-20 PROCEDURE — 36415 COLL VENOUS BLD VENIPUNCTURE: CPT

## 2020-05-20 PROCEDURE — 82306 VITAMIN D 25 HYDROXY: CPT

## 2020-05-22 LAB
BACTERIA UR CULT: NORMAL
SIGNIFICANT IND 70042: NORMAL
SITE SITE: NORMAL
SOURCE SOURCE: NORMAL

## 2020-05-23 LAB
SARS-COV-2 RNA SPEC QL NAA+PROBE: NOT DETECTED
SPECIMEN SOURCE: NORMAL

## 2020-05-24 DIAGNOSIS — E78.5 DYSLIPIDEMIA: ICD-10-CM

## 2020-05-26 ENCOUNTER — OFFICE VISIT (OUTPATIENT)
Dept: MEDICAL GROUP | Facility: PHYSICIAN GROUP | Age: 50
End: 2020-05-26
Payer: COMMERCIAL

## 2020-05-26 DIAGNOSIS — E55.9 VITAMIN D DEFICIENCY: ICD-10-CM

## 2020-05-26 DIAGNOSIS — Z79.4 TYPE 2 DIABETES MELLITUS WITH HYPEROSMOLARITY WITHOUT COMA, WITH LONG-TERM CURRENT USE OF INSULIN (HCC): ICD-10-CM

## 2020-05-26 DIAGNOSIS — E11.00 TYPE 2 DIABETES MELLITUS WITH HYPEROSMOLARITY WITHOUT COMA, WITH LONG-TERM CURRENT USE OF INSULIN (HCC): ICD-10-CM

## 2020-05-26 DIAGNOSIS — E78.5 DYSLIPIDEMIA: ICD-10-CM

## 2020-05-26 DIAGNOSIS — E11.65 UNCONTROLLED TYPE 2 DIABETES MELLITUS WITH HYPERGLYCEMIA (HCC): ICD-10-CM

## 2020-05-26 PROCEDURE — 99443 PR PHYSICIAN TELEPHONE EVALUATION 21-30 MIN: CPT | Mod: CR | Performed by: FAMILY MEDICINE

## 2020-05-26 RX ORDER — ATORVASTATIN CALCIUM 10 MG/1
10 TABLET, FILM COATED ORAL
Qty: 90 TAB | Refills: 3 | Status: SHIPPED | OUTPATIENT
Start: 2020-05-26 | End: 2021-04-05

## 2020-05-26 RX ORDER — ATORVASTATIN CALCIUM 10 MG/1
TABLET, FILM COATED ORAL
Qty: 30 TAB | Refills: 3 | Status: SHIPPED | OUTPATIENT
Start: 2020-05-26 | End: 2020-05-26 | Stop reason: SDUPTHER

## 2020-05-26 NOTE — PROGRESS NOTES
In order to limit the spread of COVID 19 Virus, this visit was conducted by telephone at the patient's request. The patient consented to this telephone visit.  Start of Visit   11:49 AM             End of Visit 12:12 PM               Length of Visit 21 minutes    1. Dyslipidemia  atorvastatin (LIPITOR) 10 MG Tab   2. Uncontrolled type 2 diabetes mellitus with hyperglycemia (HCC)  Empagliflozin-metFORMIN HCl ER  MG TABLET SR 24 HR   3. Vitamin D deficiency     4. Type 2 diabetes mellitus with hyperosmolarity without coma, with long-term current use of insulin (HCC)  Empagliflozin-metFORMIN HCl ER  MG TABLET SR 24 HR    glucose blood (ONE TOUCH ULTRA TEST) strip       Patient is doing well--we reviewed her excellent labs and did her refills for her. She wants to get back to work as the [employer] opens up soon.I told to her to  be very careful and to wear a mask.  No chest pains or SOB.    I reviewed the following    Past Medical History:   Diagnosis Date   • Allergic rhinitis due to allergen 3/12/2014   • Diabetes mellitus, type 2 (HCC) 3/14   • GDM (gestational diabetes mellitus)    • Hyperlipidemia         History reviewed. No pertinent surgical history.    No Known Allergies    Current Outpatient Medications   Medication Sig Dispense Refill   • atorvastatin (LIPITOR) 10 MG Tab Take 1 Tab by mouth every day. 90 Tab 3   • Empagliflozin-metFORMIN HCl ER  MG TABLET SR 24 HR Take 1 tablet by mouth 1 time daily as needed. 90 Tab 3   • glucose blood (ONE TOUCH ULTRA TEST) strip USE ONE STRIP DAILY FOR BLOOD SUGAR MONITORING 100 Strip 3   • Blood Glucose Monitoring Suppl Device Meter: Dispense One Touch Glucose Meter and all necessary supplies. Sig. Use daily for blood sugar monitoring. #1. NR. 1 Device 0   • etodolac (LODINE) 400 MG tablet Take 1 Tab by mouth 2 times a day. Take with food for menstrual cramps 30 Tab 3     No current facility-administered medications for this visit.          Family History   Problem Relation Age of Onset   • Diabetes Father    • Heart Disease Father    • Hyperlipidemia Father    • Stroke Father    • Hypertension Father    • Diabetes Mother    • Diabetes Brother    • Diabetes Brother    • Thyroid Sister        Social History     Socioeconomic History   • Marital status:      Spouse name: Not on file   • Number of children: 3   • Years of education: HS   • Highest education level: Not on file   Occupational History   • Occupation:      Employer: [employer]   Social Needs   • Financial resource strain: Not on file   • Food insecurity     Worry: Not on file     Inability: Not on file   • Transportation needs     Medical: Not on file     Non-medical: Not on file   Tobacco Use   • Smoking status: Never Smoker   • Smokeless tobacco: Never Used   Substance and Sexual Activity   • Alcohol use: No   • Drug use: No   • Sexual activity: Yes     Partners: Male     Birth control/protection: Surgical     Comment:  with vasectomy   Lifestyle   • Physical activity     Days per week: Not on file     Minutes per session: Not on file   • Stress: Not on file   Relationships   • Social connections     Talks on phone: Not on file     Gets together: Not on file     Attends [employer]ism service: Not on file     Active member of club or organization: Not on file     Attends meetings of clubs or organizations: Not on file     Relationship status: Not on file   • Intimate partner violence     Fear of current or ex partner: Not on file     Emotionally abused: Not on file     Physically abused: Not on file     Forced sexual activity: Not on file   Other Topics Concern   • Not on file   Social History Narrative   • Not on file

## 2020-05-26 NOTE — TELEPHONE ENCOUNTER
Received request via: Pharmacy    Was the patient seen in the last year in this department? Yes LOV Today phone visit scheduled    Does the patient have an active prescription (recently filled or refills available) for medication(s) requested? No

## 2020-06-23 ENCOUNTER — APPOINTMENT (OUTPATIENT)
Dept: ENDOCRINOLOGY | Facility: MEDICAL CENTER | Age: 50
End: 2020-06-23
Payer: COMMERCIAL

## 2020-10-26 ENCOUNTER — OFFICE VISIT (OUTPATIENT)
Dept: MEDICAL GROUP | Facility: PHYSICIAN GROUP | Age: 50
End: 2020-10-26
Payer: COMMERCIAL

## 2020-10-26 VITALS
HEART RATE: 92 BPM | TEMPERATURE: 97 F | HEIGHT: 61 IN | BODY MASS INDEX: 28.51 KG/M2 | RESPIRATION RATE: 14 BRPM | WEIGHT: 151 LBS | SYSTOLIC BLOOD PRESSURE: 136 MMHG | DIASTOLIC BLOOD PRESSURE: 84 MMHG | OXYGEN SATURATION: 98 %

## 2020-10-26 DIAGNOSIS — E11.65 UNCONTROLLED TYPE 2 DIABETES MELLITUS WITH HYPERGLYCEMIA (HCC): ICD-10-CM

## 2020-10-26 DIAGNOSIS — R23.2 HOT FLASHES: ICD-10-CM

## 2020-10-26 DIAGNOSIS — E78.5 DYSLIPIDEMIA: ICD-10-CM

## 2020-10-26 DIAGNOSIS — Z76.89 ENCOUNTER TO ESTABLISH CARE: ICD-10-CM

## 2020-10-26 DIAGNOSIS — R03.0 ELEVATED BLOOD PRESSURE READING WITHOUT DIAGNOSIS OF HYPERTENSION: ICD-10-CM

## 2020-10-26 DIAGNOSIS — E55.9 VITAMIN D DEFICIENCY: ICD-10-CM

## 2020-10-26 DIAGNOSIS — Z12.12 SCREENING FOR COLORECTAL CANCER: ICD-10-CM

## 2020-10-26 DIAGNOSIS — Z12.11 SCREENING FOR COLORECTAL CANCER: ICD-10-CM

## 2020-10-26 DIAGNOSIS — E66.9 OBESITY (BMI 30-39.9): ICD-10-CM

## 2020-10-26 PROCEDURE — 99214 OFFICE O/P EST MOD 30 MIN: CPT | Performed by: NURSE PRACTITIONER

## 2020-10-26 ASSESSMENT — FIBROSIS 4 INDEX: FIB4 SCORE: 1.02

## 2020-10-26 ASSESSMENT — PATIENT HEALTH QUESTIONNAIRE - PHQ9: CLINICAL INTERPRETATION OF PHQ2 SCORE: 0

## 2020-10-26 NOTE — ASSESSMENT & PLAN NOTE
Chronic medical problem. She is taking Synjardy  mg daily. She checks her blood sugar occasionally and gets reading around 100's. She had retinal exam recently at [facility] and is due for health maintenance.

## 2020-10-26 NOTE — ASSESSMENT & PLAN NOTE
Chronic medical problem. She is currently taking atorvastatin 10 mg daily. She walks frequently for her job. She is tolerating the medication. She will have muscle aches on the days she works but not on her days off. Last lab results:  Results for ARUNA GODINEZ (MRN [id number]) as of 10/26/2020 14:52   Ref. Range 5/20/2020 07:53   Cholesterol,Tot Latest Ref Range: 100 - 199 mg/dL 125   Triglycerides Latest Ref Range: 0 - 149 mg/dL 97   HDL Latest Ref Range: >=40 mg/dL 40   LDL Latest Ref Range: <100 mg/dL 66

## 2020-10-26 NOTE — ASSESSMENT & PLAN NOTE
Chronic medical problem. She is not taking any supplementation. Last lab results:  Results for ARUNA GODINEZ (MRN [id number]) as of 10/26/2020 14:52   Ref. Range 5/20/2020 07:53   25-Hydroxy   Vitamin D 25 Latest Ref Range: 30 - 100 ng/mL 54

## 2020-10-26 NOTE — PROGRESS NOTES
Subjective:     CC:  Diagnoses of Encounter to establish care, Uncontrolled type 2 diabetes mellitus with hyperglycemia (HCC), Dyslipidemia, Elevated blood pressure reading without diagnosis of hypertension, Hot flashes, Obesity (BMI 30-39.9), Vitamin D deficiency, and Screening for colorectal cancer were pertinent to this visit.    HISTORY OF THE PRESENT ILLNESS: Patient is a 50 y.o. female. This pleasant patient is here today with her  Collin  to establish care and discuss the following. Her prior PCP was Dr. Rich.    Hot flashes  Chronic medical problem. Her last menstrual period was March 2020. She continues to have intermittent hot flashes but states that have been decreasing in frequency.    Dyslipidemia  Chronic medical problem. She is currently taking atorvastatin 10 mg daily. She walks frequently for her job. She is tolerating the medication. She will have muscle aches on the days she works but not on her days off. Last lab results:  Results for ARUNA GODINEZ (MRN [id number]) as of 10/26/2020 14:52   Ref. Range 5/20/2020 07:53   Cholesterol,Tot Latest Ref Range: 100 - 199 mg/dL 125   Triglycerides Latest Ref Range: 0 - 149 mg/dL 97   HDL Latest Ref Range: >=40 mg/dL 40   LDL Latest Ref Range: <100 mg/dL 66       Elevated blood pressure reading without diagnosis of hypertension  Chronic medical problem. She is taking Aleve daily as needed. She has physical job that she walks frequently. She has a blood pressure cuff at home but does not check her blood pressure at home. Her initial BP today is 150/84. Denies any chest pain, shortness of breath, palpations, dizziness, blurry vision or headaches.     Uncontrolled type 2 diabetes mellitus with hyperglycemia (HCC)  Chronic medical problem. She is taking Synjardy  mg daily. She checks her blood sugar occasionally and gets reading around 100's. She had retinal exam recently at [facility] and is due for health maintenance.     Vitamin D  deficiency  Chronic medical problem. She is not taking any supplementation. Last lab results:  Results for ARUNA GODINEZ (MRN [id number]) as of 10/26/2020 14:52   Ref. Range 5/20/2020 07:53   25-Hydroxy   Vitamin D 25 Latest Ref Range: 30 - 100 ng/mL 54       Obesity (BMI 30-39.9)  Chronic medical problem. Her BMI today is 28.53       Allergies: Patient has no known allergies.    Current Outpatient Medications Ordered in Epic   Medication Sig Dispense Refill   • atorvastatin (LIPITOR) 10 MG Tab Take 1 Tab by mouth every day. 90 Tab 3   • Empagliflozin-metFORMIN HCl ER  MG TABLET SR 24 HR Take 1 tablet by mouth 1 time daily as needed. 90 Tab 3   • glucose blood (ONE TOUCH ULTRA TEST) strip USE ONE STRIP DAILY FOR BLOOD SUGAR MONITORING 100 Strip 3   • Blood Glucose Monitoring Suppl Device Meter: Dispense One Touch Glucose Meter and all necessary supplies. Sig. Use daily for blood sugar monitoring. #1. NR. 1 Device 0     No current Epic-ordered facility-administered medications on file.        Past Medical History:   Diagnosis Date   • Allergic rhinitis due to allergen 3/12/2014   • Diabetes mellitus, type 2 (HCC) 3/14   • GDM (gestational diabetes mellitus)    • Hyperlipidemia    • Vitamin D deficiency        History reviewed. No pertinent surgical history.    Social History     Tobacco Use   • Smoking status: Never Smoker   • Smokeless tobacco: Never Used   Substance Use Topics   • Alcohol use: No   • Drug use: No       Social History     Social History Narrative   • Not on file       Family History   Problem Relation Age of Onset   • Diabetes Father    • Heart Disease Father    • Hyperlipidemia Father    • Stroke Father    • Hypertension Father    • Diabetes Mother    • Hyperlipidemia Mother    • Diabetes Brother    • Diabetes Brother    • Thyroid Sister    • No Known Problems Brother    • No Known Problems Brother    • No Known Problems Daughter    • No Known Problems Daughter    • No Known Problems Son   "      Health Maintenance: Due for hep b vaccine, tdap, pap smear, monofilament exam, retinal screening, colonoscopy, zoster vaccine and A1C.    ROS:   Gen: no fevers/chills, no changes in weight  Eyes: no changes in vision  ENT: no sore throat, no ear pain  Pulm: no sob, no cough  CV: no chest pain, no palpitations  GI: no nausea/vomiting, no diarrhea  : no dysuria  MSk: no myalgias  Skin: no rash  Neuro: no headaches, no dizziness      Objective:     Vital signs reviewed  Exam: /84   Pulse 92   Temp 36.1 °C (97 °F) (Temporal)   Resp 14   Ht 1.549 m (5' 1\")   Wt 68.5 kg (151 lb)   SpO2 98%  Body mass index is 28.53 kg/m².    General: Normal appearing. No distress.  present in exam room.   HENT: Normocephalic. Ears normal shape and contour, canals are have cerumen present bilaterally, tympanic membranes are benign.  Eyes: Eyes conjunctiva clear lids without ptosis, pupils equal and reactive to light accommodation, lids normal.  Neck: Supple without JVD. Thyroid is not enlarged.  Pulmonary: Clear to ausculation.  Normal effort. No rales, ronchi, or wheezing.  Cardiovascular: Regular rate and rhythm without murmur. Radial pulses are intact and equal bilaterally.  Abdomen: Soft, nontender, nondistended.   Neurologic: Grossly nonfocal  Lymph: No cervical or supraclavicular lymph nodes are palpable  Skin: Warm and dry.  No obvious lesions.  Musculoskeletal: Normal gait. No extremity cyanosis, clubbing, or edema.  Psych: Normal mood and affect. Alert and oriented x3. Judgment and insight is normal.    Monofilament testing with a 10 gram force: sensation intact: intact bilaterally  Visual Inspection: Feet without maceration, ulcers, fissures.  Pedal pulses: intact bilaterally      Assessment & Plan:   50 y.o. female with the following -    1. Encounter to establish care  New problem to examiner. Care established.     2. Uncontrolled type 2 diabetes mellitus with hyperglycemia (HCC)  New problem to " examiner. Continue synjardy. She does not need medication refill today. Monofilament exam completed today. Continue home blood sugar monitoring. We will request records from [facility] for last retinal exam. She will return for annual for . Monitor and follow.   - Diabetic Monofilament Lower Extremity Exam    3. Dyslipidemia  New problem to examiner. Continue atorvastatin. She is up to date on labs. No medication refill needed today.  Discussed that she may try compression socks on days that she works to see if this helps her leg pain.  Monitor and follow.     4. Elevated blood pressure reading without diagnosis of hypertension  New problem to examiner.  On repeat by me her blood pressure was 136/84.  She has a home blood pressure monitoring cuff at home and she will begin to monitor her blood pressures.  Discussed with patient her goal is for her blood pressure to be less than 140/90.  Red flags discussed.  Monitor and follow.    5. Hot flashes  New problem to examiner.  Discussed wearing cotton clothing and dressing in layers.  Discussed she may use fan to help with hot flashes.  She verbalized understanding.  Monitor and follow.    6. Obesity (BMI 30-39.9)  New problem to examiner.  Discussed diet and lifestyle modifications.  Monitor and follow.    7. Vitamin D deficiency  New problem to examiner.  She is up-to-date on labs.  Monitor and follow.    8. Screening for colorectal cancer  New problem to examiner.  Screening indicated.  Patient in agreement.  Orders placed.  Monitor and follow.  - OCCULT BLOOD FECES IMMUNOASSAY (FIT); Future        Return in about 6 months (around 4/26/2021) for annual pap.    Please note that this dictation was created using voice recognition software. I have made every reasonable attempt to correct obvious errors, but I expect that there are errors of grammar and possibly content that I did not discover before finalizing the note.

## 2020-10-26 NOTE — ASSESSMENT & PLAN NOTE
Chronic medical problem. She is taking Aleve daily as needed. She has physical job that she walks frequently. She has a blood pressure cuff at home but does not check her blood pressure at home. Her initial BP today is 150/84. Denies any chest pain, shortness of breath, palpations, dizziness, blurry vision or headaches.

## 2020-10-26 NOTE — LETTER
[facility]  JATIN Mayer  [address] [place] [address]  Fax: [phone]   Authorization for Release/Disclosure of   Protected Health Information   Name: ARUNA MULLIGAN RAI : 1970 SSN: [id number]   Address:  [facility]  [place] [address] Phone:    [phone] (home)    I authorize the entity listed below to release/disclose the PHI below to:   [facility]/JATIN Mayer and JATIN Mayer   [facility]    Address   City, State, Zip   Phone:      Fax:     Reason for request: continuity of care   Information to be released:    [  ] LAST COLONOSCOPY,  including any PATH REPORT and follow-up  [  ] LAST FIT/COLOGUARD RESULT [  ] LAST DEXA  [  ] LAST MAMMOGRAM  [  ] LAST PAP  [  ] LAST LABS [x] RETINA EXAM REPORT  [  ] IMMUNIZATION RECORDS  [  ] Release all info      [  ] Check here and initial the line next to each item to release ALL health information INCLUDING  _____ Care and treatment for drug and / or alcohol abuse  _____ HIV testing, infection status, or AIDS  _____ Genetic Testing    DATES OF SERVICE OR TIME PERIOD TO BE DISCLOSED: _____________  I understand and acknowledge that:  * This Authorization may be revoked at any time by you in writing, except if your health information has already been used or disclosed.  * Your health information that will be used or disclosed as a result of you signing this authorization could be re-disclosed by the recipient. If this occurs, your re-disclosed health information may no longer be protected by State or Federal laws.  * You may refuse to sign this Authorization. Your refusal will not affect your ability to obtain treatment.  * This Authorization becomes effective upon signing and will  on (date) __________.      If no date is indicated, this Authorization will  one (1) year from the signature date.    Name: Aruna Mulligan Rai    Signature:   Date:          10/26/2020       PLEASE FAX REQUESTED RECORDS BACK TO: [phone]

## 2020-10-26 NOTE — ASSESSMENT & PLAN NOTE
Chronic medical problem. Her last menstrual period was March 2020. She continues to have intermittent hot flashes but states that have been decreasing in frequency.

## 2021-03-04 ENCOUNTER — TELEPHONE (OUTPATIENT)
Dept: MEDICAL GROUP | Facility: PHYSICIAN GROUP | Age: 51
End: 2021-03-04

## 2021-03-04 NOTE — TELEPHONE ENCOUNTER
DOCUMENTATION OF PAR STATUS:    1. Name of Medication & Dose: Empagliflozin-metFORMIN HCl ER  MG TABLET SR 24 HR     2. Name of Prescription Coverage Company & phone #: [employer]    3. Date Prior Au0th Submitted: 03/04/2021    4. What information was given to obtain insurance decision? Dx Code    5. Prior Auth Status? Unable to process PA due to Insurance information is out of date    6. Patient Notified: N\A

## 2021-04-05 ENCOUNTER — OFFICE VISIT (OUTPATIENT)
Dept: MEDICAL GROUP | Facility: PHYSICIAN GROUP | Age: 51
End: 2021-04-05
Payer: COMMERCIAL

## 2021-04-05 VITALS
WEIGHT: 155 LBS | SYSTOLIC BLOOD PRESSURE: 124 MMHG | DIASTOLIC BLOOD PRESSURE: 84 MMHG | TEMPERATURE: 97.3 F | OXYGEN SATURATION: 96 % | HEART RATE: 87 BPM | BODY MASS INDEX: 30.43 KG/M2 | HEIGHT: 60 IN

## 2021-04-05 DIAGNOSIS — E78.5 DYSLIPIDEMIA: ICD-10-CM

## 2021-04-05 DIAGNOSIS — E11.65 UNCONTROLLED TYPE 2 DIABETES MELLITUS WITH HYPERGLYCEMIA (HCC): ICD-10-CM

## 2021-04-05 DIAGNOSIS — E55.9 VITAMIN D DEFICIENCY: ICD-10-CM

## 2021-04-05 DIAGNOSIS — R03.0 ELEVATED BLOOD PRESSURE READING WITHOUT DIAGNOSIS OF HYPERTENSION: ICD-10-CM

## 2021-04-05 DIAGNOSIS — Z12.11 SCREENING FOR COLORECTAL CANCER: ICD-10-CM

## 2021-04-05 DIAGNOSIS — Z12.12 SCREENING FOR COLORECTAL CANCER: ICD-10-CM

## 2021-04-05 PROBLEM — G25.81 RESTLESS LEGS: Status: RESOLVED | Noted: 2021-04-05 | Resolved: 2021-04-05

## 2021-04-05 PROBLEM — G25.81 RESTLESS LEGS: Status: ACTIVE | Noted: 2021-04-05

## 2021-04-05 PROCEDURE — 99214 OFFICE O/P EST MOD 30 MIN: CPT | Performed by: NURSE PRACTITIONER

## 2021-04-05 RX ORDER — SIMVASTATIN 10 MG
10 TABLET ORAL EVERY EVENING
Qty: 90 TABLET | Refills: 1 | Status: SHIPPED | OUTPATIENT
Start: 2021-04-05 | End: 2021-08-16

## 2021-04-05 ASSESSMENT — FIBROSIS 4 INDEX: FIB4 SCORE: 1.02

## 2021-04-05 ASSESSMENT — PATIENT HEALTH QUESTIONNAIRE - PHQ9: CLINICAL INTERPRETATION OF PHQ2 SCORE: 0

## 2021-04-05 NOTE — ASSESSMENT & PLAN NOTE
Chronic medical problem.  She is not taking any blood pressure medication.  Her initial blood pressure today is 136/84.  She does check her blood pressure at home and gets similar readings as today's.  She denies any blood pressures greater than 140/90.  Denies chest pain, shortness of breath, dizziness, or headaches.

## 2021-04-05 NOTE — PROGRESS NOTES
Subjective:     CC: Leg pain and labs    HPI:   Aruna presents today with the following:    Dyslipidemia  Chronic medical problem.  She is taking atorvastatin 10 mg daily. She has been on the medication for years. For the last 6 months she is having muscle pain 5 days a week during the week when she works. Her job is primarily walking on the [employer] floors. She is taking Aleve. The pain is in her bilateral upper thighs.  Last lab results:  Results for ARUNA GODINEZ (MRN [id number]) as of 4/5/2021 13:24   Ref. Range 5/20/2020 07:53   Cholesterol,Tot Latest Ref Range: 100 - 199 mg/dL 125   Triglycerides Latest Ref Range: 0 - 149 mg/dL 97   HDL Latest Ref Range: >=40 mg/dL 40   LDL Latest Ref Range: <100 mg/dL 66       Uncontrolled type 2 diabetes mellitus with hyperglycemia (HCC)  Chronic medical problem.  She is taking empagliflozin-metformin ER  mg daily. She checks her blood sugar sometimes at home, highest blood glucose is 150 and lowest blood glucose is 119. She has not had blood sugars less than 70.  She is due for updated labs.  Last results:  Results for ARUNA GODINEZ (MRN [id number]) as of 4/5/2021 13:59   Ref. Range 5/20/2020 07:53   Glycohemoglobin Latest Ref Range: 0.0 - 5.6 % 6.2 (H)       Vitamin D deficiency  Chronic medical problem.  She is not taking supplementation.  She is due for updated labs.  Last lab results:  Results for ARUNA GODINEZ (MRN [id number]) as of 4/5/2021 13:59   Ref. Range 5/20/2020 07:53   25-Hydroxy   Vitamin D 25 Latest Ref Range: 30 - 100 ng/mL 54       Elevated blood pressure reading without diagnosis of hypertension  Chronic medical problem.  She is not taking any blood pressure medication.  Her initial blood pressure today is 136/84.  She does check her blood pressure at home and gets similar readings as today's.  She denies any blood pressures greater than 140/90.  Denies chest pain, shortness of breath, dizziness, or headaches.        Past Medical History:  "  Diagnosis Date   • Allergic rhinitis due to allergen 3/12/2014   • Diabetes mellitus, type 2 (HCC) 3/14   • GDM (gestational diabetes mellitus)    • Hyperlipidemia    • Vitamin D deficiency        Social History     Tobacco Use   • Smoking status: Never Smoker   • Smokeless tobacco: Never Used   Substance Use Topics   • Alcohol use: No   • Drug use: No       Current Outpatient Medications Ordered in Epic   Medication Sig Dispense Refill   • simvastatin (ZOCOR) 10 MG Tab Take 1 tablet by mouth every evening. 90 tablet 1   • Empagliflozin-metFORMIN HCl ER  MG TABLET SR 24 HR Take 1 tablet by mouth 1 time daily as needed. 90 Tab 3   • glucose blood (ONE TOUCH ULTRA TEST) strip USE ONE STRIP DAILY FOR BLOOD SUGAR MONITORING 100 Strip 3   • Blood Glucose Monitoring Suppl Device Meter: Dispense One Touch Glucose Meter and all necessary supplies. Sig. Use daily for blood sugar monitoring. #1. NR. 1 Device 0     No current Epic-ordered facility-administered medications on file.       Allergies:  Patient has no known allergies.    Health Maintenance: She is getting her second Covid vaccine 4/6/2021.  Hold Tdap and zoster vaccine today.  She is due for colon cancer screening, A1c screening, and Pap smear.  She and her  both declined Pap smear today.    ROS:  Gen: no fevers/chills, no changes in weight  Eyes: no changes in vision  Pulm: no shortness of breath  CV: no chest pain  MSk: + myalgias  Skin: no rash  Neuro: no headaches, no dizziness    Objective:     Vital signs reviewed  Exam:  /84   Pulse 87   Temp 36.3 °C (97.3 °F) (Temporal)   Ht 1.534 m (5' 0.39\")   Wt 70.3 kg (155 lb)   SpO2 96%   BMI 29.88 kg/m²  Body mass index is 29.88 kg/m².    Gen: Alert and oriented, No apparent distress.   present in exam room.  Neck: Neck is supple without lymphadenopathy.  Lungs: Normal effort, CTA bilaterally, no wheezes, rhonchi, or rales  CV: Regular rate and rhythm. No murmurs, rubs, or gallops.  " Radial pulses 2+ bilaterally.  Ext: No clubbing, cyanosis, edema.      Assessment & Plan:     50 y.o. female with the following -     1. Uncontrolled type 2 diabetes mellitus with hyperglycemia (HCC)  Chronic stable problem.  Continue empagliflozin-metformin.  Continue home blood sugar monitoring.  She is due for updated labs.  - CBC WITH DIFFERENTIAL; Future  - Comp Metabolic Panel; Future  - HEMOGLOBIN A1C; Future  - Lipid Profile; Future  - MICROALBUMIN CREAT RATIO URINE; Future    2. Dyslipidemia  Chronic exacerbated problem.  Will check CK level.  Discussed with patient that she should complete updated labs.  After her labs we will start her on simvastatin and stop atorvastatin.  She will recheck her lipid panel in 3 months after starting simvastatin and follow-up.  Discussed for muscle pain we will check updated labs, encourage compression socks and well fitting shoes.  - Comp Metabolic Panel; Future  - Lipid Profile; Future  - CREATINE KINASE; Future  - simvastatin (ZOCOR) 10 MG Tab; Take 1 tablet by mouth every evening.  Dispense: 90 tablet; Refill: 1    3. Elevated blood pressure reading without diagnosis of hypertension  Chronic stable problem.  On repeat by me her blood pressure is 124/84.  She will continue with home blood pressure monitoring.  BP is at goal today.    4. Vitamin D deficiency  Chronic stable problem.  She is due for updated labs.  Orders placed.  - VITAMIN D,25 HYDROXY; Future    5. Screening for colorectal cancer  Chronic stable problem.  Screening indicated.  Patient is in agreement.  Orders placed.  - OCCULT BLOOD FECES IMMUNOASSAY (FIT); Future      Return in about 3 months (around 7/5/2021) for Labs, cholesterol .    Please note that this dictation was created using voice recognition software. I have made every reasonable attempt to correct obvious errors, but I expect that there are errors of grammar and possibly content that I did not discover before finalizing the note.

## 2021-04-05 NOTE — ASSESSMENT & PLAN NOTE
Chronic medical problem.  She is taking atorvastatin 10 mg daily. She has been on the medication for years. For the last 6 months she is having muscle pain 5 days a week during the week when she works. Her job is primarily walking on the [employer] floors. She is taking Aleve. The pain is in her bilateral upper thighs.  Last lab results:  Results for RAUNA GODINEZ (MRN [id number]) as of 4/5/2021 13:24   Ref. Range 5/20/2020 07:53   Cholesterol,Tot Latest Ref Range: 100 - 199 mg/dL 125   Triglycerides Latest Ref Range: 0 - 149 mg/dL 97   HDL Latest Ref Range: >=40 mg/dL 40   LDL Latest Ref Range: <100 mg/dL 66

## 2021-04-05 NOTE — ASSESSMENT & PLAN NOTE
Chronic medical problem.  She is taking empagliflozin-metformin ER  mg daily. She checks her blood sugar sometimes at home, highest blood glucose is 150 and lowest blood glucose is 119. She has not had blood sugars less than 70.  She is due for updated labs.  Last results:  Results for ARUNA GODINEZ (MRN [id number]) as of 4/5/2021 13:59   Ref. Range 5/20/2020 07:53   Glycohemoglobin Latest Ref Range: 0.0 - 5.6 % 6.2 (H)

## 2021-04-05 NOTE — ASSESSMENT & PLAN NOTE
Chronic medical problem.  She is not taking supplementation.  She is due for updated labs.  Last lab results:  Results for ARUNA GODINEZ (MRN [id number]) as of 4/5/2021 13:59   Ref. Range 5/20/2020 07:53   25-Hydroxy   Vitamin D 25 Latest Ref Range: 30 - 100 ng/mL 54

## 2021-04-13 ENCOUNTER — HOSPITAL ENCOUNTER (OUTPATIENT)
Dept: LAB | Facility: MEDICAL CENTER | Age: 51
End: 2021-04-13
Attending: NURSE PRACTITIONER
Payer: COMMERCIAL

## 2021-04-13 DIAGNOSIS — E78.5 DYSLIPIDEMIA: ICD-10-CM

## 2021-04-13 DIAGNOSIS — E11.65 UNCONTROLLED TYPE 2 DIABETES MELLITUS WITH HYPERGLYCEMIA (HCC): ICD-10-CM

## 2021-04-13 DIAGNOSIS — E55.9 VITAMIN D DEFICIENCY: ICD-10-CM

## 2021-04-13 LAB
ALBUMIN SERPL BCP-MCNC: 4.5 G/DL (ref 3.2–4.9)
ALBUMIN/GLOB SERPL: 1.3 G/DL
ALP SERPL-CCNC: 96 U/L (ref 30–99)
ALT SERPL-CCNC: 38 U/L (ref 2–50)
ANION GAP SERPL CALC-SCNC: 12 MMOL/L (ref 7–16)
AST SERPL-CCNC: 30 U/L (ref 12–45)
BASOPHILS # BLD AUTO: 0.7 % (ref 0–1.8)
BASOPHILS # BLD: 0.06 K/UL (ref 0–0.12)
BILIRUB SERPL-MCNC: 0.4 MG/DL (ref 0.1–1.5)
BUN SERPL-MCNC: 15 MG/DL (ref 8–22)
CALCIUM SERPL-MCNC: 9.9 MG/DL (ref 8.5–10.5)
CHLORIDE SERPL-SCNC: 105 MMOL/L (ref 96–112)
CHOLEST SERPL-MCNC: 140 MG/DL (ref 100–199)
CK SERPL-CCNC: 81 U/L (ref 0–154)
CO2 SERPL-SCNC: 25 MMOL/L (ref 20–33)
CREAT SERPL-MCNC: 0.69 MG/DL (ref 0.5–1.4)
EOSINOPHIL # BLD AUTO: 0.27 K/UL (ref 0–0.51)
EOSINOPHIL NFR BLD: 3.1 % (ref 0–6.9)
ERYTHROCYTE [DISTWIDTH] IN BLOOD BY AUTOMATED COUNT: 42.9 FL (ref 35.9–50)
EST. AVERAGE GLUCOSE BLD GHB EST-MCNC: 151 MG/DL
FASTING STATUS PATIENT QL REPORTED: NORMAL
GLOBULIN SER CALC-MCNC: 3.5 G/DL (ref 1.9–3.5)
GLUCOSE SERPL-MCNC: 91 MG/DL (ref 65–99)
HBA1C MFR BLD: 6.9 % (ref 4–5.6)
HCT VFR BLD AUTO: 46.6 % (ref 37–47)
HDLC SERPL-MCNC: 34 MG/DL
HGB BLD-MCNC: 14.7 G/DL (ref 12–16)
IMM GRANULOCYTES # BLD AUTO: 0.02 K/UL (ref 0–0.11)
IMM GRANULOCYTES NFR BLD AUTO: 0.2 % (ref 0–0.9)
LDLC SERPL CALC-MCNC: 87 MG/DL
LYMPHOCYTES # BLD AUTO: 2.9 K/UL (ref 1–4.8)
LYMPHOCYTES NFR BLD: 33.6 % (ref 22–41)
MCH RBC QN AUTO: 28.7 PG (ref 27–33)
MCHC RBC AUTO-ENTMCNC: 31.5 G/DL (ref 33.6–35)
MCV RBC AUTO: 90.8 FL (ref 81.4–97.8)
MONOCYTES # BLD AUTO: 0.38 K/UL (ref 0–0.85)
MONOCYTES NFR BLD AUTO: 4.4 % (ref 0–13.4)
NEUTROPHILS # BLD AUTO: 4.99 K/UL (ref 2–7.15)
NEUTROPHILS NFR BLD: 58 % (ref 44–72)
NRBC # BLD AUTO: 0 K/UL
NRBC BLD-RTO: 0 /100 WBC
PLATELET # BLD AUTO: 264 K/UL (ref 164–446)
PMV BLD AUTO: 12.2 FL (ref 9–12.9)
POTASSIUM SERPL-SCNC: 4.3 MMOL/L (ref 3.6–5.5)
PROT SERPL-MCNC: 8 G/DL (ref 6–8.2)
RBC # BLD AUTO: 5.13 M/UL (ref 4.2–5.4)
SODIUM SERPL-SCNC: 142 MMOL/L (ref 135–145)
TRIGL SERPL-MCNC: 93 MG/DL (ref 0–149)
WBC # BLD AUTO: 8.6 K/UL (ref 4.8–10.8)

## 2021-04-13 PROCEDURE — 82043 UR ALBUMIN QUANTITATIVE: CPT

## 2021-04-13 PROCEDURE — 80053 COMPREHEN METABOLIC PANEL: CPT

## 2021-04-13 PROCEDURE — 36415 COLL VENOUS BLD VENIPUNCTURE: CPT

## 2021-04-13 PROCEDURE — 82550 ASSAY OF CK (CPK): CPT

## 2021-04-13 PROCEDURE — 82570 ASSAY OF URINE CREATININE: CPT

## 2021-04-13 PROCEDURE — 82306 VITAMIN D 25 HYDROXY: CPT

## 2021-04-13 PROCEDURE — 80061 LIPID PANEL: CPT

## 2021-04-13 PROCEDURE — 83036 HEMOGLOBIN GLYCOSYLATED A1C: CPT

## 2021-04-13 PROCEDURE — 85025 COMPLETE CBC W/AUTO DIFF WBC: CPT

## 2021-04-14 LAB
CREAT UR-MCNC: 104.38 MG/DL
MICROALBUMIN UR-MCNC: <1.2 MG/DL
MICROALBUMIN/CREAT UR: NORMAL MG/G (ref 0–30)

## 2021-04-15 DIAGNOSIS — E78.5 DYSLIPIDEMIA: ICD-10-CM

## 2021-04-15 LAB — 25(OH)D3 SERPL-MCNC: 82 NG/ML (ref 30–80)

## 2021-04-16 NOTE — PROGRESS NOTES
Results for ARUNA GODINEZ (MRN [id number]) as of 4/15/2021 18:28   Ref. Range 4/13/2021 11:22   Cholesterol,Tot Latest Ref Range: 100 - 199 mg/dL 140   Triglycerides Latest Ref Range: 0 - 149 mg/dL 93   HDL Latest Ref Range: >=40 mg/dL 34 (A)   LDL Latest Ref Range: <100 mg/dL 87   Results for ARUNA GODINEZ (MRN [id number]) as of 4/15/2021 18:28   Ref. Range 4/13/2021 11:22   CPK Total Latest Ref Range: 0 - 154 U/L 81       Cholesterol CPK controlled.  She will stop her atorvastatin and start simvastatin.  She will recheck her lipid panel in 3 months.  Orders placed.

## 2021-07-12 DIAGNOSIS — E11.00 TYPE 2 DIABETES MELLITUS WITH HYPEROSMOLARITY WITHOUT COMA, WITH LONG-TERM CURRENT USE OF INSULIN (HCC): ICD-10-CM

## 2021-07-12 DIAGNOSIS — E11.65 UNCONTROLLED TYPE 2 DIABETES MELLITUS WITH HYPERGLYCEMIA (HCC): ICD-10-CM

## 2021-07-12 DIAGNOSIS — Z79.4 TYPE 2 DIABETES MELLITUS WITH HYPEROSMOLARITY WITHOUT COMA, WITH LONG-TERM CURRENT USE OF INSULIN (HCC): ICD-10-CM

## 2021-07-12 NOTE — TELEPHONE ENCOUNTER
Received request via: Patient    Was the patient seen in the last year in this department? Yes    Does the patient have an active prescription (recently filled or refills available) for medication(s) requested? RX'S SENT TO THE WRONG PHARMACY IN MAY, PATIENT ALSO NEED TEST STRIPS, IT WILL NOT LET ME RE-ORDER.

## 2021-07-13 NOTE — PROGRESS NOTES
Requested Prescriptions     Signed Prescriptions Disp Refills   • Blood Glucose Test Strips 100 Strip 0     Sig: Test strips order: Test strips for One touch Ultra meter. Sig: use 1 times per day and prn ssx high or low sugar #100 RF x 3       CARMEL Preciado.

## 2021-07-13 NOTE — TELEPHONE ENCOUNTER
Requested Prescriptions     Pending Prescriptions Disp Refills   • Empagliflozin-metFORMIN HCl ER  MG TABLET SR 24 HR 90 tablet 0     Sig: Take 1 tablet  by mouth 1 time a day as needed.       CARMEL Preciado.

## 2021-08-13 ENCOUNTER — HOSPITAL ENCOUNTER (OUTPATIENT)
Dept: LAB | Facility: MEDICAL CENTER | Age: 51
End: 2021-08-13
Attending: NURSE PRACTITIONER
Payer: COMMERCIAL

## 2021-08-13 DIAGNOSIS — E78.5 DYSLIPIDEMIA: ICD-10-CM

## 2021-08-13 LAB
CHOLEST SERPL-MCNC: 130 MG/DL (ref 100–199)
FASTING STATUS PATIENT QL REPORTED: NORMAL
HDLC SERPL-MCNC: 34 MG/DL
LDLC SERPL CALC-MCNC: 75 MG/DL
TRIGL SERPL-MCNC: 104 MG/DL (ref 0–149)

## 2021-08-13 PROCEDURE — 36415 COLL VENOUS BLD VENIPUNCTURE: CPT

## 2021-08-13 PROCEDURE — 80061 LIPID PANEL: CPT

## 2021-08-16 ENCOUNTER — OFFICE VISIT (OUTPATIENT)
Dept: MEDICAL GROUP | Facility: PHYSICIAN GROUP | Age: 51
End: 2021-08-16
Payer: COMMERCIAL

## 2021-08-16 VITALS
HEART RATE: 100 BPM | RESPIRATION RATE: 16 BRPM | HEIGHT: 61 IN | BODY MASS INDEX: 28.32 KG/M2 | OXYGEN SATURATION: 95 % | TEMPERATURE: 97.7 F | SYSTOLIC BLOOD PRESSURE: 132 MMHG | DIASTOLIC BLOOD PRESSURE: 82 MMHG | WEIGHT: 150 LBS

## 2021-08-16 DIAGNOSIS — Z23 NEED FOR VACCINATION: ICD-10-CM

## 2021-08-16 DIAGNOSIS — E78.5 DYSLIPIDEMIA: ICD-10-CM

## 2021-08-16 DIAGNOSIS — E11.65 UNCONTROLLED TYPE 2 DIABETES MELLITUS WITH HYPERGLYCEMIA (HCC): ICD-10-CM

## 2021-08-16 PROCEDURE — 99214 OFFICE O/P EST MOD 30 MIN: CPT | Mod: 25 | Performed by: NURSE PRACTITIONER

## 2021-08-16 PROCEDURE — 90715 TDAP VACCINE 7 YRS/> IM: CPT | Performed by: NURSE PRACTITIONER

## 2021-08-16 PROCEDURE — 90471 IMMUNIZATION ADMIN: CPT | Performed by: NURSE PRACTITIONER

## 2021-08-16 RX ORDER — SIMVASTATIN 10 MG
10 TABLET ORAL EVERY EVENING
Qty: 90 TABLET | Refills: 1
Start: 2021-08-16 | End: 2021-10-25

## 2021-08-16 ASSESSMENT — FIBROSIS 4 INDEX: FIB4 SCORE: 0.94

## 2021-08-16 NOTE — LETTER
[facility]  JATIN Mayer  [address] [address]  Fax: [phone]   Authorization for Release/Disclosure of   Protected Health Information   Name: ARUNA TORRES RAI : 1970 SSN: [id number]   Address: [address] [place] [id number] Phone:    [phone] (home)    I authorize the entity listed below to release/disclose the PHI below to:   [facility]/JATIN Mayer and JATIN Mayer   Provider or Entity Name:  [facility]   Address   City, State, [place], [place] Phone:      Fax:     Reason for request: continuity of care   Information to be released:    [  ] LAST COLONOSCOPY,  including any PATH REPORT and follow-up  [  ] LAST FIT/COLOGUARD RESULT [  ] LAST DEXA  [  ] LAST MAMMOGRAM  [  ] LAST PAP  [  ] LAST LABS [  ] RETINA EXAM REPORT  [  ] IMMUNIZATION RECORDS  [  ] Release all info      [  ] Check here and initial the line next to each item to release ALL health information INCLUDING  _____ Care and treatment for drug and / or alcohol abuse  _____ HIV testing, infection status, or AIDS  _____ Genetic Testing    DATES OF SERVICE OR TIME PERIOD TO BE DISCLOSED: _____________  I understand and acknowledge that:  * This Authorization may be revoked at any time by you in writing, except if your health information has already been used or disclosed.  * Your health information that will be used or disclosed as a result of you signing this authorization could be re-disclosed by the recipient. If this occurs, your re-disclosed health information may no longer be protected by State or Federal laws.  * You may refuse to sign this Authorization. Your refusal will not affect your ability to obtain treatment.  * This Authorization becomes effective upon signing and will  on (date) __________.      If no date is indicated, this Authorization will  one (1) year from the signature date.    Name: Aruna Torres Rai    Signature:   Date:      8/16/2021       PLEASE FAX REQUESTED RECORDS BACK TO: [phone]

## 2021-08-16 NOTE — ASSESSMENT & PLAN NOTE
Chronic medical problem.  She states that the leg pain improved for the first 2 months after stopping her atorvastatin and starting on simvastatin. Right now she continues to have intermittent leg pain. She is wearing shoes with good support. She has recently started knee-high compression socks.  She does work on her feet all day for her job. The 10-year ASCVD risk score (Riponmaame DOWNING Jr., et al., 2013) is: 2.8%.  Last lab results:  Results for ARUNA GODINEZ (MRN [id number]) as of 8/16/2021 12:34   Ref. Range 8/13/2021 06:42   Cholesterol,Tot Latest Ref Range: 100 - 199 mg/dL 130   Triglycerides Latest Ref Range: 0 - 149 mg/dL 104   HDL Latest Ref Range: >=40 mg/dL 34 (A)   LDL Latest Ref Range: <100 mg/dL 75

## 2021-08-16 NOTE — PROGRESS NOTES
Subjective:     CC: Lab results    HPI:   Aruna presents today with her  for the following:    Dyslipidemia  Chronic medical problem.  She states that the leg pain improved for the first 2 months after stopping her atorvastatin and starting on simvastatin. Right now she continues to have intermittent leg pain. She is wearing shoes with good support. She has recently started knee-high compression socks.  She does work on her feet all day for her job. The 10-year ASCVD risk score (G[place] CHANG Miranda., et al., 2013) is: 2.8%.  Last lab results:  Results for ARUNA GODINEZ (MRN [id number]) as of 8/16/2021 12:34   Ref. Range 8/13/2021 06:42   Cholesterol,Tot Latest Ref Range: 100 - 199 mg/dL 130   Triglycerides Latest Ref Range: 0 - 149 mg/dL 104   HDL Latest Ref Range: >=40 mg/dL 34 (A)   LDL Latest Ref Range: <100 mg/dL 75       Past Medical History:   Diagnosis Date   • Allergic rhinitis due to allergen 3/12/2014   • Diabetes mellitus, type 2 (HCC) 3/14   • GDM (gestational diabetes mellitus)    • Hyperlipidemia    • Vitamin D deficiency        Social History     Tobacco Use   • Smoking status: Never Smoker   • Smokeless tobacco: Never Used   Vaping Use   • Vaping Use: Never used   Substance Use Topics   • Alcohol use: No   • Drug use: No       Current Outpatient Medications Ordered in Epic   Medication Sig Dispense Refill   • simvastatin (ZOCOR) 10 MG Tab Take 1 Tablet by mouth every evening. Take 5 days a week with 2 days off. 90 Tablet 1   • Empagliflozin-metFORMIN HCl ER  MG TABLET SR 24 HR Take 1 tablet  by mouth 1 time a day as needed. 90 tablet 0   • Blood Glucose Test Strips Test strips order: Test strips for One touch Ultra meter. Sig: use 1 times per day and prn ssx high or low sugar #100 RF x 3 100 Strip 0   • glucose blood (ONE TOUCH ULTRA TEST) strip USE ONE STRIP DAILY FOR BLOOD SUGAR MONITORING 100 Strip 3   • Blood Glucose Monitoring Suppl Device Meter: Dispense One Touch Glucose Meter and all  "necessary supplies. Sig. Use daily for blood sugar monitoring. #1. NR. 1 Device 0     No current Epic-ordered facility-administered medications on file.       Allergies:  Patient has no known allergies.    Health Maintenance: Colonoscopy has been ordered.  She is interested in her Tdap today.  She had recent eye exam completed, we will request records.  She has not had chickenpox and declines zoster vaccine today.    ROS:  Gen: no fevers/chills  Pulm: no shortness of breath  CV: no chest pain  GI: no nausea/vomiting,  MSk: + Intermittent myalgias  Skin: no rash  Neuro: no headaches, no dizziness    Objective:     Vital signs reviewed  Exam:  /82 (BP Location: Left arm, Patient Position: Sitting, BP Cuff Size: Adult)   Pulse 100   Temp 36.5 °C (97.7 °F) (Temporal)   Resp 16   Ht 1.549 m (5' 1\")   Wt 68 kg (150 lb)   SpO2 95%   BMI 28.34 kg/m²  Body mass index is 28.34 kg/m².    Gen: Alert and oriented, No apparent distress.   present in exam room.  Neck: Neck is supple without lymphadenopathy.  Lungs: Normal effort, CTA bilaterally, no wheezes, rhonchi, or rales  CV: Regular rate and rhythm. No murmurs, rubs, or gallops.  Radial pulses 2+ bilaterally.  Ext: No clubbing, cyanosis, edema.      Assessment & Plan:     51 y.o. female with the following -     1. Dyslipidemia  Chronic stable problem.  Discussed reviewed her recent lab results.  Her cholesterol is controlled with simvastatin.  Given her intermittent myalgias will decrease her simvastatin 10 mg to 5 days a week with 2 days off.  We will recheck her labs in 3 months.  We discussed continuing to find good shoes with support and wearing compression socks.  We discussed using over-the-counter Voltaren to wrap to her legs with pain.  She verbalized understanding.  - Lipid Profile; Future  - simvastatin (ZOCOR) 10 MG Tab; Take 1 Tablet by mouth every evening. Take 5 days a week with 2 days off.  Dispense: 90 Tablet; Refill: 1    2. Uncontrolled " type 2 diabetes mellitus with hyperglycemia (HCC)  Chronic stable problem.  She will be due for updated A1c in November 2021 with her lipid panel.  Order placed today.  - HEMOGLOBIN A1C; Future    3. Need for vaccination  Acute uncomplicated problem.  Vaccine indicated.  Patient is in agreement. I have placed the below orders and discussed them with an approved delegating provider.  The MA is performing the below orders under the direction of ABIMAEL Chacko.   - Tdap Vaccine =>8YO IM        Return in about 3 months (around 11/16/2021) for Labs.    Please note that this dictation was created using voice recognition software. I have made every reasonable attempt to correct obvious errors, but I expect that there are errors of grammar and possibly content that I did not discover before finalizing the note.

## 2021-09-25 DIAGNOSIS — Z79.4 TYPE 2 DIABETES MELLITUS WITH HYPEROSMOLARITY WITHOUT COMA, WITH LONG-TERM CURRENT USE OF INSULIN (HCC): ICD-10-CM

## 2021-09-25 DIAGNOSIS — E11.65 UNCONTROLLED TYPE 2 DIABETES MELLITUS WITH HYPERGLYCEMIA (HCC): ICD-10-CM

## 2021-09-25 DIAGNOSIS — E11.00 TYPE 2 DIABETES MELLITUS WITH HYPEROSMOLARITY WITHOUT COMA, WITH LONG-TERM CURRENT USE OF INSULIN (HCC): ICD-10-CM

## 2021-09-27 RX ORDER — EMPAGLIFLOZIN, METFORMIN HYDROCHLORIDE 25; 1000 MG/1; MG/1
TABLET, EXTENDED RELEASE ORAL
Qty: 90 TABLET | Refills: 2 | Status: SHIPPED | OUTPATIENT
Start: 2021-09-27 | End: 2022-05-02 | Stop reason: SDUPTHER

## 2021-09-28 NOTE — TELEPHONE ENCOUNTER
Requested Prescriptions     Signed Prescriptions Disp Refills   • SYNJARDY XR  MG TABLET SR 24 HR 90 Tablet 2     Sig: TAKE ONE TABLET BY MOUTH DAILY AS NEEDED     Authorizing Provider: TRISTON FLOWER A.P.R.N.    pt with c/o left wrist pain s/p fracture last month.

## 2021-10-18 ENCOUNTER — NON-PROVIDER VISIT (OUTPATIENT)
Dept: MEDICAL GROUP | Facility: PHYSICIAN GROUP | Age: 51
End: 2021-10-18
Payer: COMMERCIAL

## 2021-10-18 DIAGNOSIS — Z23 NEED FOR VACCINATION: ICD-10-CM

## 2021-10-18 PROCEDURE — 90686 IIV4 VACC NO PRSV 0.5 ML IM: CPT | Performed by: INTERNAL MEDICINE

## 2021-10-18 PROCEDURE — 90471 IMMUNIZATION ADMIN: CPT | Performed by: INTERNAL MEDICINE

## 2021-10-18 NOTE — PROGRESS NOTES
"Ayaka Torres Rai is a 51 y.o. female here for a non-provider visit for:   FLU    Reason for immunization: Annual Flu Vaccine  Immunization records indicate need for vaccine: Yes, confirmed with Epic  Minimum interval has been met for this vaccine: Yes  ABN completed: Yes    VIS Dated  8/6/21 was given to patient: Yes  All IAC Questionnaire questions were answered \"No.\"    Patient tolerated injection and no adverse effects were observed or reported: Yes    Pt scheduled for next dose in series: No      "

## 2021-10-25 DIAGNOSIS — E78.5 DYSLIPIDEMIA: ICD-10-CM

## 2021-10-25 RX ORDER — SIMVASTATIN 10 MG
TABLET ORAL
Qty: 90 TABLET | Refills: 0 | Status: SHIPPED | OUTPATIENT
Start: 2021-10-25 | End: 2021-12-07 | Stop reason: SDUPTHER

## 2021-10-25 NOTE — TELEPHONE ENCOUNTER
Received request via: Pharmacy    Was the patient seen in the last year in this department? Yes    Does the patient have an active prescription (recently filled or refills available) for medication(s) requested? LAST REFILL SAYS NO PRINT.

## 2021-10-26 NOTE — TELEPHONE ENCOUNTER
Requested Prescriptions     Signed Prescriptions Disp Refills   • simvastatin (ZOCOR) 10 MG Tab 90 Tablet 0     Sig: TAKE ONE TABLET BY MOUTH EVERY EVENING     Authorizing Provider: SIDNEY OZUNA A.P.R.N.

## 2021-12-07 ENCOUNTER — OFFICE VISIT (OUTPATIENT)
Dept: ENDOCRINOLOGY | Facility: MEDICAL CENTER | Age: 51
End: 2021-12-07
Attending: INTERNAL MEDICINE
Payer: COMMERCIAL

## 2021-12-07 VITALS
HEIGHT: 67 IN | HEART RATE: 104 BPM | WEIGHT: 147 LBS | BODY MASS INDEX: 23.07 KG/M2 | DIASTOLIC BLOOD PRESSURE: 72 MMHG | OXYGEN SATURATION: 96 % | SYSTOLIC BLOOD PRESSURE: 112 MMHG

## 2021-12-07 DIAGNOSIS — E11.9 CONTROLLED TYPE 2 DIABETES MELLITUS WITHOUT COMPLICATION, WITHOUT LONG-TERM CURRENT USE OF INSULIN (HCC): ICD-10-CM

## 2021-12-07 DIAGNOSIS — E78.5 DYSLIPIDEMIA: ICD-10-CM

## 2021-12-07 DIAGNOSIS — E67.3 HYPERVITAMINOSIS D: ICD-10-CM

## 2021-12-07 DIAGNOSIS — Z79.84 LONG TERM (CURRENT) USE OF ORAL HYPOGLYCEMIC DRUGS: ICD-10-CM

## 2021-12-07 LAB
HBA1C MFR BLD: 6.8 % (ref 0–5.6)
INT CON NEG: NEGATIVE
INT CON POS: POSITIVE

## 2021-12-07 PROCEDURE — 99212 OFFICE O/P EST SF 10 MIN: CPT | Performed by: INTERNAL MEDICINE

## 2021-12-07 PROCEDURE — 83036 HEMOGLOBIN GLYCOSYLATED A1C: CPT | Performed by: INTERNAL MEDICINE

## 2021-12-07 PROCEDURE — 99214 OFFICE O/P EST MOD 30 MIN: CPT | Performed by: INTERNAL MEDICINE

## 2021-12-07 RX ORDER — SIMVASTATIN 10 MG
10 TABLET ORAL EVERY EVENING
Qty: 90 TABLET | Refills: 1 | Status: SHIPPED | OUTPATIENT
Start: 2021-12-07 | End: 2022-07-08 | Stop reason: SDUPTHER

## 2021-12-07 ASSESSMENT — FIBROSIS 4 INDEX: FIB4 SCORE: 0.94

## 2021-12-07 NOTE — PROGRESS NOTES
CHIEF COMPLAINT: Patient is here for follow up of Type 2 Diabetes Mellitus    HPI:     Ayaka Torres Rai is a 51 y.o. female with Type 2 Diabetes Mellitus here for follow up.    Labs from 12/7/2021 show HbA1c is 6.8%    She was previously seen by the PA and has not been seen in the diabetes clinic in 2 years but she wants to stick with [facility]  She has had type 2 diabetes since 2908-2425      She is controlled on monotherapy Synjardy XR 25/1000 mg daily  We talked about adding a GLP-1 analog to help with weight loss but she does not want to take injectable medications    She denies side effects with Synjardy and denies having yeast infections    She has hyperlipidemia and is on simvastatin 10 mg daily  LDL cholesterol was 75 on August 13, 2021 triglycerides are normal    She does not have albuminuria  U ACR was less than 30 on April 2021    She had an eye exam at [facility] on July 2021 but we do not have records    I noted that in the past her vitamin D was elevated at 82 with a normal calcium 9.9 on April 2021  She reports that she was taking excessive vitamin D at that time and she cut back her vitamin D to 2000 units daily but we do not have updated vitamin D labs on hand        BG Diary:  Patient is not required to check her sugars    Weight has been stable    Diabetes Complications   Retinopathy: No known retinopathy.  Last eye exam: We are requesting records of her eye exam  Neuropathy: Denies paresthesias or numbness in hands or feet. Denies any foot wounds.  Exercise: Minimal.  Diet: Fair.  Patient's medications, allergies, and social histories were reviewed and updated as appropriate.    ROS:     CONS:     No fever, no chills   EYES:     No diplopia, no blurry vision   CV:           No chest pain, no palpitations   PULM:     No SOB, no cough, no hemoptysis.   GI:            No nausea, no vomiting, no diarrhea, no constipation   ENDO:     No polyuria, no polydipsia, no heat intolerance, no cold intolerance  "      Past Medical History:  Problem List:  2021-12: Long term (current) use of oral hypoglycemic drugs  2021-04: Restless legs  2020-10: Elevated blood pressure reading without diagnosis of   hypertension  2020-10: Hot flashes  2020-05: Vitamin D deficiency  2019-04: Uncontrolled type 2 diabetes mellitus with hyperglycemia   (HCC)  2019-02: Obesity (BMI 30-39.9)  2014-03: Dyslipidemia  2014-03: Allergic rhinitis due to allergen  Diabetes mellitus, type 2 (HCC)      Past Surgical History:  History reviewed. No pertinent surgical history.     Allergies:  Patient has no known allergies.     Social History:  Social History     Tobacco Use   • Smoking status: Never Smoker   • Smokeless tobacco: Never Used   Vaping Use   • Vaping Use: Never used   Substance Use Topics   • Alcohol use: No   • Drug use: No        Family History:   family history includes Diabetes in her brother, brother, father, and mother; Heart Disease in her father; Hyperlipidemia in her father and mother; Hypertension in her father; No Known Problems in her brother, brother, daughter, daughter, and son; Stroke in her father; Thyroid in her sister.      PHYSICAL EXAM:   OBJECTIVE:  Vital signs: /72 (BP Location: Left arm, Patient Position: Sitting, BP Cuff Size: Adult)   Pulse (!) 104   Ht 1.702 m (5' 7\")   Wt 66.7 kg (147 lb)   SpO2 96%   BMI 23.02 kg/m²   GENERAL: Well-developed, well-nourished in no apparent distress.   EYE:  No ocular asymmetry, PERRLA  HENT: Pink, moist mucous membranes.    NECK: No thyromegaly.   CARDIOVASCULAR:  No murmurs  LUNGS: Clear breath sounds  ABDOMEN: Soft, nontender   EXTREMITIES: No clubbing, cyanosis, or edema.   NEUROLOGICAL: No gross focal motor abnormalities   LYMPH: No cervical adenopathy palpated.   SKIN: No rashes, lesions.   Monofilament testing with a 10 gram force: sensation: intact bilaterally  Visual Inspection: Feet without maceration, ulcers, or fissures.  Pedal pulses: intact " bilaterally    Labs:  Lab Results   Component Value Date/Time    HBA1C 6.8 (A) 12/07/2021 09:15 AM        Lab Results   Component Value Date/Time    WBC 8.6 04/13/2021 11:22 AM    RBC 5.13 04/13/2021 11:22 AM    HEMOGLOBIN 14.7 04/13/2021 11:22 AM    MCV 90.8 04/13/2021 11:22 AM    MCH 28.7 04/13/2021 11:22 AM    MCHC 31.5 (L) 04/13/2021 11:22 AM    RDW 42.9 04/13/2021 11:22 AM    MPV 12.2 04/13/2021 11:22 AM       Lab Results   Component Value Date/Time    SODIUM 142 04/13/2021 11:22 AM    POTASSIUM 4.3 04/13/2021 11:22 AM    CHLORIDE 105 04/13/2021 11:22 AM    CO2 25 04/13/2021 11:22 AM    ANION 12.0 04/13/2021 11:22 AM    GLUCOSE 91 04/13/2021 11:22 AM    BUN 15 04/13/2021 11:22 AM    CREATININE 0.69 04/13/2021 11:22 AM    CREATININE 0.8 07/13/2006 02:35 PM    CALCIUM 9.9 04/13/2021 11:22 AM    ASTSGOT 30 04/13/2021 11:22 AM    ALTSGPT 38 04/13/2021 11:22 AM    TBILIRUBIN 0.4 04/13/2021 11:22 AM    ALBUMIN 4.5 04/13/2021 11:22 AM    TOTPROTEIN 8.0 04/13/2021 11:22 AM    GLOBULIN 3.5 04/13/2021 11:22 AM    AGRATIO 1.3 04/13/2021 11:22 AM       Lab Results   Component Value Date/Time    CHOLSTRLTOT 130 08/13/2021 0642    TRIGLYCERIDE 104 08/13/2021 0642    HDL 34 (A) 08/13/2021 0642    LDL 75 08/13/2021 0642       Lab Results   Component Value Date/Time    MALBCRT see below 04/13/2021 11:22 AM    MICROALBUR <1.2 04/13/2021 11:22 AM        Lab Results   Component Value Date/Time    TSHULTRASEN 1.370 02/02/2017 0944     No results found for: FREEDIR  No results found for: FREET3  No results found for: THYSTIMIG        ASSESSMENT/PLAN:     1. Controlled type 2 diabetes mellitus without complication, without long-term current use of insulin (HCC)  Controlled  Patient is happy with her current regimen  Continue Synjardy 25/1000 mg daily  Consider adding a GLP-1 analog in the near future if A1c goes up higher  She is up-to-date with her labs  Foot exam was completed today  We are requesting records of her eye exam  We  will see her again in 6 months a repeat of her A1c    2. Dyslipidemia  Controlled  Continue simvastatin  Repeat fasting lipids next year    3. Hypervitaminosis D  Unstable we will check calcium vitamin D with next labs in 3 months  To make sure that her vitamin D levels are back to normal    4. Long term (current) use of oral hypoglycemic drugs  Patient is on oral hypoglycemic agents for type 2 diabetes management      Return in about 6 months (around 6/7/2022).      Thank you kindly for allowing me to participate in the diabetes care plan for this patient.    Ricco Clarke MD, FACE, [facility]  12/07/21    CC:   JATIN Mayer

## 2022-04-19 ENCOUNTER — HOSPITAL ENCOUNTER (OUTPATIENT)
Dept: LAB | Facility: MEDICAL CENTER | Age: 52
End: 2022-04-19
Attending: NURSE PRACTITIONER
Payer: COMMERCIAL

## 2022-04-19 DIAGNOSIS — E78.5 DYSLIPIDEMIA: ICD-10-CM

## 2022-04-19 DIAGNOSIS — E11.65 UNCONTROLLED TYPE 2 DIABETES MELLITUS WITH HYPERGLYCEMIA (HCC): ICD-10-CM

## 2022-04-19 LAB
CHOLEST SERPL-MCNC: 178 MG/DL (ref 100–199)
EST. AVERAGE GLUCOSE BLD GHB EST-MCNC: 146 MG/DL
FASTING STATUS PATIENT QL REPORTED: NORMAL
HBA1C MFR BLD: 6.7 % (ref 4–5.6)
HDLC SERPL-MCNC: 37 MG/DL
LDLC SERPL CALC-MCNC: 115 MG/DL
TRIGL SERPL-MCNC: 128 MG/DL (ref 0–149)

## 2022-04-19 PROCEDURE — 36415 COLL VENOUS BLD VENIPUNCTURE: CPT

## 2022-04-19 PROCEDURE — 80061 LIPID PANEL: CPT

## 2022-04-19 PROCEDURE — 83036 HEMOGLOBIN GLYCOSYLATED A1C: CPT

## 2022-05-02 ENCOUNTER — OFFICE VISIT (OUTPATIENT)
Dept: MEDICAL GROUP | Facility: PHYSICIAN GROUP | Age: 52
End: 2022-05-02
Payer: COMMERCIAL

## 2022-05-02 VITALS
SYSTOLIC BLOOD PRESSURE: 124 MMHG | RESPIRATION RATE: 18 BRPM | WEIGHT: 150 LBS | HEART RATE: 90 BPM | TEMPERATURE: 98.6 F | DIASTOLIC BLOOD PRESSURE: 80 MMHG | BODY MASS INDEX: 28.32 KG/M2 | OXYGEN SATURATION: 96 % | HEIGHT: 61 IN

## 2022-05-02 DIAGNOSIS — Z23 NEED FOR VACCINATION: ICD-10-CM

## 2022-05-02 DIAGNOSIS — E78.5 DYSLIPIDEMIA: ICD-10-CM

## 2022-05-02 DIAGNOSIS — Z12.11 SCREENING FOR COLORECTAL CANCER: ICD-10-CM

## 2022-05-02 DIAGNOSIS — E11.9 TYPE 2 DIABETES MELLITUS WITHOUT COMPLICATION, WITHOUT LONG-TERM CURRENT USE OF INSULIN (HCC): ICD-10-CM

## 2022-05-02 DIAGNOSIS — Z12.12 SCREENING FOR COLORECTAL CANCER: ICD-10-CM

## 2022-05-02 DIAGNOSIS — L30.9 ECZEMA OF FACE: ICD-10-CM

## 2022-05-02 PROCEDURE — 99214 OFFICE O/P EST MOD 30 MIN: CPT | Mod: 25 | Performed by: NURSE PRACTITIONER

## 2022-05-02 PROCEDURE — 90471 IMMUNIZATION ADMIN: CPT | Performed by: NURSE PRACTITIONER

## 2022-05-02 PROCEDURE — 90732 PPSV23 VACC 2 YRS+ SUBQ/IM: CPT | Performed by: NURSE PRACTITIONER

## 2022-05-02 RX ORDER — EMPAGLIFLOZIN, METFORMIN HYDROCHLORIDE 25; 1000 MG/1; MG/1
1 TABLET, EXTENDED RELEASE ORAL DAILY
Qty: 90 TABLET | Refills: 1 | Status: SHIPPED | OUTPATIENT
Start: 2022-05-02 | End: 2022-11-23

## 2022-05-02 ASSESSMENT — PATIENT HEALTH QUESTIONNAIRE - PHQ9: CLINICAL INTERPRETATION OF PHQ2 SCORE: 0

## 2022-05-02 ASSESSMENT — FIBROSIS 4 INDEX: FIB4 SCORE: 0.94

## 2022-05-02 NOTE — ASSESSMENT & PLAN NOTE
She reports ongoing problem for years with eczema to her bilateral eyelids.  She uses CeraVe daily.  She like to know if there are other options she can try.

## 2022-05-02 NOTE — PROGRESS NOTES
Subjective:     CC: Lab results    HPI:   Ayaka presents today with the following:    Dyslipidemia  She is taking simvastatin 10 mg every evening 5 days a week and some weeks she will take 7 days. She plans to restart 7 days dosing after she saw her recent labs. She is not exercising but walks at least 7 miles for her job.  Her leg pain has improved.    Type 2 diabetes mellitus without complication, without long-term current use of insulin ([facility])  She is scheduled for her exam exam next month at [facility].  She continues with her Synjardy  mg daily.  She is followed by endocrinology.  Recent A1c is stable.    Eczema of face  She reports ongoing problem for years with eczema to her bilateral eyelids.  She uses CeraVe daily.  She like to know if there are other options she can try.      Past Medical History:   Diagnosis Date   • Allergic rhinitis due to allergen 3/12/2014   • Diabetes mellitus, type 2 (HCC) 3/14   • GDM (gestational diabetes mellitus)    • Hyperlipidemia    • Vitamin D deficiency        Social History     Tobacco Use   • Smoking status: Never Smoker   • Smokeless tobacco: Never Used   Vaping Use   • Vaping Use: Never used   Substance Use Topics   • Alcohol use: No   • Drug use: No       Current Outpatient Medications Ordered in Epic   Medication Sig Dispense Refill   • Empagliflozin-metFORMIN HCl ER (SYNJARDY XR)  MG TABLET SR 24 HR Take 1 Tablet by mouth every day. 90 Tablet 1   • simvastatin (ZOCOR) 10 MG Tab Take 1 Tablet by mouth every evening. 90 Tablet 1   • Blood Glucose Test Strips Test strips order: Test strips for One touch Ultra meter. Sig: use 1 times per day and prn ssx high or low sugar #100 RF x 3 100 Strip 0   • glucose blood (ONE TOUCH ULTRA TEST) strip USE ONE STRIP DAILY FOR BLOOD SUGAR MONITORING 100 Strip 3   • Blood Glucose Monitoring Suppl Device Meter: Dispense One Touch Glucose Meter and all necessary supplies. Sig. Use daily for blood sugar monitoring. #1. NR. 1  "Device 0     No current [facility]-ordered facility-administered medications on file.       Allergies:  Patient has no known allergies.    Health Maintenance: She declines mammogram and colon cancer screening today.  Discussed that there recommended she continues to decline.  She will schedule annual with Pap at a later date this year with me.  She has retinal screening appointment scheduled.      Objective:     Vital signs reviewed  Exam:  /80 (BP Location: Right arm, Patient Position: Sitting, BP Cuff Size: Adult)   Pulse 90   Temp 37 °C (98.6 °F) (Temporal)   Resp 18   Ht 1.549 m (5' 1\")   Wt 68 kg (150 lb)   SpO2 96%   BMI 28.34 kg/m²  Body mass index is 28.34 kg/m².    Gen: Alert and oriented, No apparent distress.  Eyes:   Lids normal.  No erythematous areas visible or dry areas visible today.  Lungs: Normal effort, CTA bilaterally, no wheezes, rhonchi, or rales  CV: Regular rate and rhythm. No murmurs, rubs, or gallops.  Ext: No clubbing, cyanosis, edema.      Assessment & Plan:     51 y.o. female with the following -     1. Type 2 diabetes mellitus without complication, without long-term current use of insulin ([facility])  Chronic stable problem.  Discussed and reviewed her recent lab results.  She will continue with her Synjardy -9517 mg daily.  Continue follow-up with endocrinology.  Continue healthy diet and we discussed adding exercise to her routine.  She verbalizes understanding.    - Empagliflozin-metFORMIN HCl ER (SYNJARDY XR)  MG TABLET SR 24 HR; Take 1 Tablet by mouth every day.  Dispense: 90 Tablet; Refill: 1    2. Dyslipidemia  Chronic exacerbated problem.  Discussed and reviewed her recent lab results.  She plans to continue with simvastatin 10 mg 7 days a week.  Plan to recheck updated labs in 3 months.  Continue healthy diet and exercise.  - Lipid Profile; Future    3. Eczema of face  Chronic stable problem.  Discussed that she should moisturize at least twice a day or more " often depending on how dry her skin is.  Discussed her moisturizer should be fragrance free.  Discussed she may use humidifier at nighttime.  She verbalized understanding.    4. Screening for colorectal cancer  Chronic stable problem.  She is interested in Cologuard today.  Order placed.  - COLOGUARD (FIT DNA)    5. Need for vaccination  Acute uncomplicated problem.  Due for pneumococcal vaccine and she is in agreement today. I have placed the below orders and discussed them with an approved delegating provider.  The MA is performing the below orders under the direction of Dr. Mendoza.  - Pneumococal Polysaccharide Vaccine 23-Valent =>3YO SQ/IM      Return in about 3 months (around 8/2/2022) for cholesterol, Labs.    Please note that this dictation was created using voice recognition software. I have made every reasonable attempt to correct obvious errors, but I expect that there are errors of grammar and possibly content that I did not discover before finalizing the note.

## 2022-05-02 NOTE — ASSESSMENT & PLAN NOTE
She is taking simvastatin 10 mg every evening 5 days a week and some weeks she will take 7 days. She plans to restart 7 days dosing after she saw her recent labs. She is not exercising but walks at least 7 miles for her job.  Her leg pain has improved.

## 2022-05-02 NOTE — ASSESSMENT & PLAN NOTE
She is scheduled for her exam exam next month at [facility].  She continues with her Synjardy  mg daily.  She is followed by endocrinology.  Recent A1c is stable.

## 2022-05-03 ENCOUNTER — TELEPHONE (OUTPATIENT)
Dept: MEDICAL GROUP | Facility: PHYSICIAN GROUP | Age: 52
End: 2022-05-03
Payer: COMMERCIAL

## 2022-05-03 NOTE — TELEPHONE ENCOUNTER
DOCUMENTATION OF PAR STATUS:    1. Name of Medication & Dose: Synjardy     2. Name of Prescription Coverage Company & phone #: [phone]    3. Date Prior Auth Submitted: 5/3/22    4. What information was given to obtain insurance decision? Diagnosis code    5. Prior Auth Status? Pending    6. Patient Notified: no

## 2022-05-31 ENCOUNTER — HOSPITAL ENCOUNTER (OUTPATIENT)
Dept: LAB | Facility: MEDICAL CENTER | Age: 52
End: 2022-05-31
Attending: INTERNAL MEDICINE
Payer: COMMERCIAL

## 2022-05-31 DIAGNOSIS — Z79.84 LONG TERM (CURRENT) USE OF ORAL HYPOGLYCEMIC DRUGS: ICD-10-CM

## 2022-05-31 DIAGNOSIS — E78.5 DYSLIPIDEMIA: ICD-10-CM

## 2022-05-31 DIAGNOSIS — E11.9 CONTROLLED TYPE 2 DIABETES MELLITUS WITHOUT COMPLICATION, WITHOUT LONG-TERM CURRENT USE OF INSULIN (HCC): ICD-10-CM

## 2022-05-31 DIAGNOSIS — E67.3 HYPERVITAMINOSIS D: ICD-10-CM

## 2022-05-31 LAB
25(OH)D3 SERPL-MCNC: 60 NG/ML (ref 30–100)
ALBUMIN SERPL BCP-MCNC: 4.6 G/DL (ref 3.2–4.9)
ALBUMIN/GLOB SERPL: 1.2 G/DL
ALP SERPL-CCNC: 106 U/L (ref 30–99)
ALT SERPL-CCNC: 31 U/L (ref 2–50)
ANION GAP SERPL CALC-SCNC: 12 MMOL/L (ref 7–16)
AST SERPL-CCNC: 24 U/L (ref 12–45)
BILIRUB SERPL-MCNC: 0.2 MG/DL (ref 0.1–1.5)
BUN SERPL-MCNC: 12 MG/DL (ref 8–22)
CALCIUM SERPL-MCNC: 10.1 MG/DL (ref 8.5–10.5)
CHLORIDE SERPL-SCNC: 107 MMOL/L (ref 96–112)
CHOLEST SERPL-MCNC: 182 MG/DL (ref 100–199)
CO2 SERPL-SCNC: 22 MMOL/L (ref 20–33)
CREAT SERPL-MCNC: 0.69 MG/DL (ref 0.5–1.4)
CREAT UR-MCNC: 32.79 MG/DL
FASTING STATUS PATIENT QL REPORTED: NORMAL
GFR SERPLBLD CREATININE-BSD FMLA CKD-EPI: 104 ML/MIN/1.73 M 2
GLOBULIN SER CALC-MCNC: 3.7 G/DL (ref 1.9–3.5)
GLUCOSE SERPL-MCNC: 113 MG/DL (ref 65–99)
HDLC SERPL-MCNC: 40 MG/DL
LDLC SERPL CALC-MCNC: 116 MG/DL
MICROALBUMIN UR-MCNC: <1.2 MG/DL
MICROALBUMIN/CREAT UR: NORMAL MG/G (ref 0–30)
POTASSIUM SERPL-SCNC: 4.7 MMOL/L (ref 3.6–5.5)
PROT SERPL-MCNC: 8.3 G/DL (ref 6–8.2)
SODIUM SERPL-SCNC: 141 MMOL/L (ref 135–145)
T4 FREE SERPL-MCNC: 1.26 NG/DL (ref 0.93–1.7)
TRIGL SERPL-MCNC: 128 MG/DL (ref 0–149)
TSH SERPL DL<=0.005 MIU/L-ACNC: 2.97 UIU/ML (ref 0.38–5.33)
VIT B12 SERPL-MCNC: 1295 PG/ML (ref 211–911)

## 2022-05-31 PROCEDURE — 80053 COMPREHEN METABOLIC PANEL: CPT

## 2022-05-31 PROCEDURE — 82607 VITAMIN B-12: CPT

## 2022-05-31 PROCEDURE — 36415 COLL VENOUS BLD VENIPUNCTURE: CPT

## 2022-05-31 PROCEDURE — 84439 ASSAY OF FREE THYROXINE: CPT

## 2022-05-31 PROCEDURE — 82043 UR ALBUMIN QUANTITATIVE: CPT

## 2022-05-31 PROCEDURE — 82306 VITAMIN D 25 HYDROXY: CPT

## 2022-05-31 PROCEDURE — 82570 ASSAY OF URINE CREATININE: CPT

## 2022-05-31 PROCEDURE — 84443 ASSAY THYROID STIM HORMONE: CPT

## 2022-05-31 PROCEDURE — 80061 LIPID PANEL: CPT

## 2022-06-06 ENCOUNTER — OFFICE VISIT (OUTPATIENT)
Dept: ENDOCRINOLOGY | Facility: MEDICAL CENTER | Age: 52
End: 2022-06-06
Attending: INTERNAL MEDICINE
Payer: COMMERCIAL

## 2022-06-06 VITALS
HEART RATE: 106 BPM | HEIGHT: 61 IN | DIASTOLIC BLOOD PRESSURE: 88 MMHG | WEIGHT: 146 LBS | SYSTOLIC BLOOD PRESSURE: 122 MMHG | OXYGEN SATURATION: 98 % | BODY MASS INDEX: 27.56 KG/M2

## 2022-06-06 DIAGNOSIS — E11.9 CONTROLLED TYPE 2 DIABETES MELLITUS WITHOUT COMPLICATION, WITHOUT LONG-TERM CURRENT USE OF INSULIN (HCC): ICD-10-CM

## 2022-06-06 DIAGNOSIS — E55.9 VITAMIN D DEFICIENCY: ICD-10-CM

## 2022-06-06 DIAGNOSIS — Z79.84 LONG TERM (CURRENT) USE OF ORAL HYPOGLYCEMIC DRUGS: ICD-10-CM

## 2022-06-06 DIAGNOSIS — E78.5 DYSLIPIDEMIA: ICD-10-CM

## 2022-06-06 PROCEDURE — 99214 OFFICE O/P EST MOD 30 MIN: CPT | Performed by: INTERNAL MEDICINE

## 2022-06-06 PROCEDURE — 99211 OFF/OP EST MAY X REQ PHY/QHP: CPT | Performed by: INTERNAL MEDICINE

## 2022-06-06 RX ORDER — PEN NEEDLE, DIABETIC 31 GX5/16"
1 NEEDLE, DISPOSABLE MISCELLANEOUS
Qty: 30 EACH | Refills: 6 | Status: SHIPPED | OUTPATIENT
Start: 2022-06-06

## 2022-06-06 RX ORDER — PEN NEEDLE, DIABETIC 32GX 5/32"
1 NEEDLE, DISPOSABLE MISCELLANEOUS
Qty: 30 EACH | Refills: 6 | Status: SHIPPED | OUTPATIENT
Start: 2022-06-06 | End: 2023-10-16 | Stop reason: SDUPTHER

## 2022-06-06 RX ORDER — SEMAGLUTIDE 1.34 MG/ML
0.5 INJECTION, SOLUTION SUBCUTANEOUS
Qty: 1.5 ML | Refills: 11 | Status: SHIPPED | OUTPATIENT
Start: 2022-06-06 | End: 2023-04-04

## 2022-06-06 ASSESSMENT — FIBROSIS 4 INDEX: FIB4 SCORE: 0.83

## 2022-06-06 NOTE — PROGRESS NOTES
CHIEF COMPLAINT: Patient is here for follow up of Type 2 Diabetes Mellitus    HPI:     Ayaka Torres Rai is a 51 y.o. female with Type 2 Diabetes Mellitus here for follow up.    Labs from April 19, 2022 show A1c is 6.7%  Labs from 12/7/2021 show HbA1c was 6.8%    She was previously seen by the PA   But she wants to stick with [facility]  She has had type 2 diabetes since 1152-4714      She is controlled on monotherapy Synjardy XR 25/1000 mg daily    She denies side effects with Synjardy and denies having yeast infections  She reports that she is now open to starting GLP-1 analog therapy to help reduce CV risk in light of her diabetes and because she is overweight.        She has hyperlipidemia and is on simvastatin 10 mg daily  Her LDL cholesterol went up to 116 on May 2022 but she admits that this is because of missing doses of simvastatin because of muscle aches      She does not have albuminuria  U ACR was less than 30 on May 2022  U ACR was less than 30 on April 2021      She denies peripheral neuropathy symptoms  She denies foot sores        She had an eye exam at [facility] on August 2021 but we do not have records      She is also taking vitamin D3 2000 IU daily  Her vitamin D was adequate at 60 on May 31, 2022 calcium was 10.1 and serum creatinine was 0.69          BG Diary:  Patient is not required to check her sugars    Weight has been stable    Diabetes Complications   Retinopathy: No known retinopathy.  Last eye exam: We are requesting records of her eye exam  Neuropathy: Denies paresthesias or numbness in hands or feet. Denies any foot wounds.  Exercise: Minimal.  Diet: Fair.  Patient's medications, allergies, and social histories were reviewed and updated as appropriate.    ROS:     CONS:     No fever, no chills   EYES:     No diplopia, no blurry vision   CV:           No chest pain, no palpitations   PULM:     No SOB, no cough, no hemoptysis.   GI:            No nausea, no vomiting, no diarrhea, no  "constipation   ENDO:     No polyuria, no polydipsia, no heat intolerance, no cold intolerance       Past Medical History:  Problem List:  2022-05: Eczema of face  2021-12: Long term (current) use of oral hypoglycemic drugs  2021-04: Restless legs  2020-10: Elevated blood pressure reading without diagnosis of   hypertension  2020-10: Hot flashes  2020-05: Vitamin D deficiency  2019-04: Type 2 diabetes mellitus without complication, without long-  term current use of insulin ([facility])  2019-02: Obesity (BMI 30-39.9)  2014-03: Dyslipidemia  2014-03: Allergic rhinitis due to allergen  Diabetes mellitus, type 2 ([facility])      Past Surgical History:  No past surgical history on file.     Allergies:  Patient has no known allergies.     Social History:  Social History     Tobacco Use   • Smoking status: Never Smoker   • Smokeless tobacco: Never Used   Vaping Use   • Vaping Use: Never used   Substance Use Topics   • Alcohol use: No   • Drug use: No        Family History:   family history includes Diabetes in her brother, brother, father, and mother; Heart Disease in her father; Hyperlipidemia in her father and mother; Hypertension in her father; No Known Problems in her brother, brother, daughter, daughter, and son; Stroke in her father; Thyroid in her sister.      PHYSICAL EXAM:   OBJECTIVE:  Vital signs: /88 (BP Location: Left arm, Patient Position: Sitting, BP Cuff Size: Adult)   Pulse (!) 106   Ht 1.549 m (5' 1\")   Wt 66.2 kg (146 lb)   SpO2 98%   BMI 27.59 kg/m²   GENERAL: Well-developed, well-nourished in no apparent distress.   EYE:  No ocular asymmetry, PERRLA  HENT: Pink, moist mucous membranes.    NECK: No thyromegaly.   CARDIOVASCULAR:  No murmurs  LUNGS: Clear breath sounds  ABDOMEN: Soft, nontender   EXTREMITIES: No clubbing, cyanosis, or edema.   NEUROLOGICAL: No gross focal motor abnormalities   LYMPH: No cervical adenopathy palpated.   SKIN: No rashes, lesions.   Monofilament testing with a 10 gram force: " sensation: intact bilaterally  Visual Inspection: Feet without maceration, ulcers, or fissures.  Pedal pulses: intact bilaterally    Labs:  Lab Results   Component Value Date/Time    HBA1C 6.7 (H) 04/19/2022 10:01 AM        Lab Results   Component Value Date/Time    WBC 8.6 04/13/2021 11:22 AM    RBC 5.13 04/13/2021 11:22 AM    HEMOGLOBIN 14.7 04/13/2021 11:22 AM    MCV 90.8 04/13/2021 11:22 AM    MCH 28.7 04/13/2021 11:22 AM    MCHC 31.5 (L) 04/13/2021 11:22 AM    RDW 42.9 04/13/2021 11:22 AM    MPV 12.2 04/13/2021 11:22 AM       Lab Results   Component Value Date/Time    SODIUM 141 05/31/2022 09:40 AM    POTASSIUM 4.7 05/31/2022 09:40 AM    CHLORIDE 107 05/31/2022 09:40 AM    CO2 22 05/31/2022 09:40 AM    ANION 12.0 05/31/2022 09:40 AM    GLUCOSE 113 (H) 05/31/2022 09:40 AM    BUN 12 05/31/2022 09:40 AM    CREATININE 0.69 05/31/2022 09:40 AM    CREATININE 0.8 07/13/2006 02:35 PM    CALCIUM 10.1 05/31/2022 09:40 AM    ASTSGOT 24 05/31/2022 09:40 AM    ALTSGPT 31 05/31/2022 09:40 AM    TBILIRUBIN 0.2 05/31/2022 09:40 AM    ALBUMIN 4.6 05/31/2022 09:40 AM    TOTPROTEIN 8.3 (H) 05/31/2022 09:40 AM    GLOBULIN 3.7 (H) 05/31/2022 09:40 AM    AGRATIO 1.2 05/31/2022 09:40 AM       Lab Results   Component Value Date/Time    CHOLSTRLTOT 130 08/13/2021 0642    TRIGLYCERIDE 104 08/13/2021 0642    HDL 34 (A) 08/13/2021 0642    LDL 75 08/13/2021 0642       Lab Results   Component Value Date/Time    MALBCRT see below 05/31/2022 09:40 AM    MICROALBUR <1.2 05/31/2022 09:40 AM        Lab Results   Component Value Date/Time    TSHULTVERONA 1.370 02/02/2017 0944     No results found for: FREEDIR  No results found for: FREET3  No results found for: THYSTIMIG        ASSESSMENT/PLAN:     1. Controlled type 2 diabetes mellitus without complication, without long-term current use of insulin (HCC)  Controlled  Start Ozempic 0.25 mg weekly to reduce residual CV dse risk  Reviewed side effects of Ozempic  Continue Synjardy 25/1000 mg  daily  She is up-to-date with her labs  Foot exam was completed today  I recommended she get records of her eye exam  We will see her again in 6 months a repeat of her A1c    2. Dyslipidemia  Controlled  Continue simvastatin  Repeat fasting lipids in 12 months      3. Vitamin D deficiency  Controlled  Continue vitamin D3 2000 IU daily  Repeat calcium and vitamin D in 6 months    4. Long term (current) use of oral hypoglycemic drugs  Patient is on oral hypoglycemic agents for type 2 diabetes management      Return in about 6 months (around 12/6/2022).      Thank you kindly for allowing me to participate in the diabetes care plan for this patient.    Ricco Clarke MD, FACE, [facility]  12/07/21    CC:   JATIN Mayer

## 2022-06-09 ENCOUNTER — PATIENT MESSAGE (OUTPATIENT)
Dept: ENDOCRINOLOGY | Facility: MEDICAL CENTER | Age: 52
End: 2022-06-09
Payer: COMMERCIAL

## 2022-06-09 DIAGNOSIS — E11.9 CONTROLLED TYPE 2 DIABETES MELLITUS WITHOUT COMPLICATION, WITHOUT LONG-TERM CURRENT USE OF INSULIN (HCC): ICD-10-CM

## 2022-06-10 RX ORDER — SEMAGLUTIDE 1.34 MG/ML
0.5 INJECTION, SOLUTION SUBCUTANEOUS
Qty: 1.5 ML | Refills: 11 | Status: CANCELLED
Start: 2022-06-10

## 2022-06-10 NOTE — PATIENT COMMUNICATION
I spoke to Ayaka Pennington's , I explained I had not released the medication to [facility] because the medication required a prior auth and I have not gotten a response from [facility]. I let him know that I would release the medication and give him a call when I got the response from the insurance. He was very appreciative for the call.

## 2022-07-08 DIAGNOSIS — E78.5 DYSLIPIDEMIA: ICD-10-CM

## 2022-07-08 RX ORDER — SIMVASTATIN 10 MG
10 TABLET ORAL EVERY EVENING
Qty: 90 TABLET | Refills: 0 | Status: SHIPPED | OUTPATIENT
Start: 2022-07-08 | End: 2022-10-24

## 2022-07-08 NOTE — TELEPHONE ENCOUNTER
Requested Prescriptions     Pending Prescriptions Disp Refills   • simvastatin (ZOCOR) 10 MG Tab 90 Tablet 0     Sig: Take 1 Tablet by mouth every evening.       CARMEL Preciado.

## 2022-08-02 ENCOUNTER — OFFICE VISIT (OUTPATIENT)
Dept: MEDICAL GROUP | Facility: PHYSICIAN GROUP | Age: 52
End: 2022-08-02
Payer: COMMERCIAL

## 2022-08-02 VITALS
SYSTOLIC BLOOD PRESSURE: 132 MMHG | WEIGHT: 149 LBS | TEMPERATURE: 96.7 F | HEART RATE: 86 BPM | OXYGEN SATURATION: 98 % | HEIGHT: 61 IN | DIASTOLIC BLOOD PRESSURE: 72 MMHG | BODY MASS INDEX: 28.13 KG/M2

## 2022-08-02 DIAGNOSIS — E55.9 VITAMIN D DEFICIENCY: ICD-10-CM

## 2022-08-02 DIAGNOSIS — Z79.84 LONG TERM (CURRENT) USE OF ORAL HYPOGLYCEMIC DRUGS: ICD-10-CM

## 2022-08-02 DIAGNOSIS — E11.9 TYPE 2 DIABETES MELLITUS WITHOUT COMPLICATION, WITHOUT LONG-TERM CURRENT USE OF INSULIN (HCC): ICD-10-CM

## 2022-08-02 DIAGNOSIS — E78.5 DYSLIPIDEMIA: ICD-10-CM

## 2022-08-02 PROCEDURE — 99214 OFFICE O/P EST MOD 30 MIN: CPT | Performed by: NURSE PRACTITIONER

## 2022-08-02 PROCEDURE — 92250 FUNDUS PHOTOGRAPHY W/I&R: CPT | Mod: TC | Performed by: NURSE PRACTITIONER

## 2022-08-02 ASSESSMENT — FIBROSIS 4 INDEX: FIB4 SCORE: 0.85

## 2022-08-02 NOTE — ASSESSMENT & PLAN NOTE
She is followed by endocrinology and does need a new referral. Her ozempic was not approved. She continues with Synjardy XR  mg daily dosing. Due for retinal screening today.

## 2022-08-02 NOTE — ASSESSMENT & PLAN NOTE
She continues with simvastatin 10 mg every evening 7 days a week. She is not having myalgias. She is able to tolerate and has been compliant with the daily dosing. She did not complete the labs I had ordered.

## 2022-08-02 NOTE — PROGRESS NOTES
Subjective:     CC: lab results    HPI:   Ayaka presents today with the following:    Dyslipidemia  She continues with simvastatin 10 mg every evening 7 days a week. She is not having myalgias. She is able to tolerate and has been compliant with the daily dosing. She did not complete the labs I had ordered.     Type 2 diabetes mellitus without complication, without long-term current use of insulin (HCC)  She is followed by endocrinology and does need a new referral. Her ozempic was not approved. She continues with Synjardy XR  mg daily dosing. Due for retinal screening today.       Past Medical History:   Diagnosis Date   • Allergic rhinitis due to allergen 3/12/2014   • Diabetes mellitus, type 2 (HCC) 3/14   • GDM (gestational diabetes mellitus)    • Hyperlipidemia    • Vitamin D deficiency        Social History     Tobacco Use   • Smoking status: Never Smoker   • Smokeless tobacco: Never Used   Vaping Use   • Vaping Use: Never used   Substance Use Topics   • Alcohol use: No   • Drug use: No       Current Outpatient Medications Ordered in Epic   Medication Sig Dispense Refill   • simvastatin (ZOCOR) 10 MG Tab Take 1 Tablet by mouth every evening. 90 Tablet 0   • BD PEN NEEDLE RAFAEL U/F Inject 1 Each under the skin every 7 days. For use with weekly  GLP-1 injection. 30 Each 6   • Alcohol Swabs (ALCOHOL PREP) Pads 1 Each every 7 days. 30 Each 6   • Empagliflozin-metFORMIN HCl ER (SYNJARDY XR)  MG TABLET SR 24 HR Take 1 Tablet by mouth every day. 90 Tablet 1   • Blood Glucose Test Strips Test strips order: Test strips for One touch Ultra meter. Sig: use 1 times per day and prn ssx high or low sugar #100 RF x 3 100 Strip 0   • glucose blood (ONE TOUCH ULTRA TEST) strip USE ONE STRIP DAILY FOR BLOOD SUGAR MONITORING 100 Strip 3   • Blood Glucose Monitoring Suppl Device Meter: Dispense One Touch Glucose Meter and all necessary supplies. Sig. Use daily for blood sugar monitoring. #1. NR. 1 Device 0   •  "Semaglutide,0.25 or 0.5MG/DOS, (OZEMPIC, 0.25 OR 0.5 MG/DOSE,) 2 MG/1.5ML Solution Pen-injector Inject 0.5 mg under the skin every 7 days.  (Patient not taking: Reported on 8/2/2022) 1.5 mL 11     No current Epic-ordered facility-administered medications on file.       Allergies:  Patient has no known allergies.    Health Maintenance: Reviewed      Objective:     Vital signs reviewed  Exam:  /72 (BP Location: Right arm, Patient Position: Sitting, BP Cuff Size: Adult)   Pulse 86   Temp 35.9 °C (96.7 °F) (Temporal)   Ht 1.549 m (5' 1\")   Wt 67.6 kg (149 lb)   SpO2 98%   BMI 28.15 kg/m²  Body mass index is 28.15 kg/m².    Gen: Alert and oriented, No apparent distress.  Lungs: Normal effort, CTA bilaterally, no wheezes, rhonchi, or rales  CV: Regular rate and rhythm. No murmurs, rubs, or gallops.  Ext: No clubbing, cyanosis, edema.      Assessment & Plan:     52 y.o. female with the following -     1. Dyslipidemia  Chronic stable problem.  Discussed to complete updated lipid panel to see how the daily dosing is working.  Discussed if her LDL has increased we will plan to increase her simvastatin dose to 20 mg every evening and repeat labs in 3 months.  Discussed that if her LDL is improved or at goal she will continue with rosuvastatin 10 mg every evening.  She verbalized understanding.  - Lipid Profile; Future  - Referral to Endocrinology    2. Type 2 diabetes mellitus without complication, without long-term current use of insulin (HCC)  Chronic stable problem.  Continue follow-up with endocrinology.  Continue with Synjardy XR  mg daily.  POCT retinal screening completed today.  - POCT Retinal Eye Exam  - Referral to Endocrinology    3. Vitamin D deficiency  4. Long term (current) use of oral hypoglycemic drugs  Chronic stable problem. She would like her annual referral to endocrinology.   - Referral to Endocrinology      Return for annual with pap.    Please note that this dictation was created " using voice recognition software. I have made every reasonable attempt to correct obvious errors, but I expect that there are errors of grammar and possibly content that I did not discover before finalizing the note.

## 2022-08-31 LAB — RETINAL SCREEN: NEGATIVE

## 2022-10-22 DIAGNOSIS — E78.5 DYSLIPIDEMIA: ICD-10-CM

## 2022-10-24 RX ORDER — SIMVASTATIN 10 MG
TABLET ORAL
Qty: 90 TABLET | Refills: 0 | Status: SHIPPED | OUTPATIENT
Start: 2022-10-24 | End: 2022-12-14

## 2022-11-22 DIAGNOSIS — E11.9 TYPE 2 DIABETES MELLITUS WITHOUT COMPLICATION, WITHOUT LONG-TERM CURRENT USE OF INSULIN (HCC): ICD-10-CM

## 2022-11-23 RX ORDER — EMPAGLIFLOZIN, METFORMIN HYDROCHLORIDE 25; 1000 MG/1; MG/1
TABLET, EXTENDED RELEASE ORAL
Qty: 90 TABLET | Refills: 1 | Status: SHIPPED | OUTPATIENT
Start: 2022-11-23 | End: 2023-04-04 | Stop reason: SDUPTHER

## 2022-11-24 NOTE — TELEPHONE ENCOUNTER
Requested Prescriptions     Signed Prescriptions Disp Refills    Empagliflozin-metFORMIN HCl ER (SYNJARDY XR)  MG TABLET SR 24 HR 90 Tablet 1     Sig: TAKE ONE TABLET BY MOUTH ONCE DAILY     Authorizing Provider: TRISTON FLOWER A.P.R.N.

## 2022-12-13 DIAGNOSIS — E78.5 DYSLIPIDEMIA: ICD-10-CM

## 2022-12-14 RX ORDER — SIMVASTATIN 10 MG
TABLET ORAL
Qty: 90 TABLET | Refills: 0 | Status: SHIPPED | OUTPATIENT
Start: 2022-12-14 | End: 2023-03-13

## 2023-03-13 DIAGNOSIS — E78.5 DYSLIPIDEMIA: ICD-10-CM

## 2023-03-13 RX ORDER — SIMVASTATIN 10 MG
TABLET ORAL
Qty: 90 TABLET | Refills: 0 | Status: SHIPPED
Start: 2023-03-13 | End: 2023-04-04

## 2023-03-28 ENCOUNTER — HOSPITAL ENCOUNTER (OUTPATIENT)
Dept: LAB | Facility: MEDICAL CENTER | Age: 53
End: 2023-03-28
Attending: INTERNAL MEDICINE
Payer: COMMERCIAL

## 2023-03-28 DIAGNOSIS — E78.5 DYSLIPIDEMIA: ICD-10-CM

## 2023-03-28 DIAGNOSIS — E11.9 CONTROLLED TYPE 2 DIABETES MELLITUS WITHOUT COMPLICATION, WITHOUT LONG-TERM CURRENT USE OF INSULIN (HCC): ICD-10-CM

## 2023-03-28 DIAGNOSIS — Z79.84 LONG TERM (CURRENT) USE OF ORAL HYPOGLYCEMIC DRUGS: ICD-10-CM

## 2023-03-28 DIAGNOSIS — E55.9 VITAMIN D DEFICIENCY: ICD-10-CM

## 2023-03-28 LAB
25(OH)D3 SERPL-MCNC: 55 NG/ML (ref 30–100)
ALBUMIN SERPL BCP-MCNC: 4.6 G/DL (ref 3.2–4.9)
ALBUMIN/GLOB SERPL: 1.2 G/DL
ALP SERPL-CCNC: 109 U/L (ref 30–99)
ALT SERPL-CCNC: 58 U/L (ref 2–50)
ANION GAP SERPL CALC-SCNC: 15 MMOL/L (ref 7–16)
AST SERPL-CCNC: 39 U/L (ref 12–45)
BILIRUB SERPL-MCNC: 0.3 MG/DL (ref 0.1–1.5)
BUN SERPL-MCNC: 14 MG/DL (ref 8–22)
CALCIUM ALBUM COR SERPL-MCNC: 9.7 MG/DL (ref 8.5–10.5)
CALCIUM SERPL-MCNC: 10.2 MG/DL (ref 8.5–10.5)
CHLORIDE SERPL-SCNC: 101 MMOL/L (ref 96–112)
CHOLEST SERPL-MCNC: 170 MG/DL (ref 100–199)
CO2 SERPL-SCNC: 22 MMOL/L (ref 20–33)
CREAT SERPL-MCNC: 0.65 MG/DL (ref 0.5–1.4)
FASTING STATUS PATIENT QL REPORTED: NORMAL
GFR SERPLBLD CREATININE-BSD FMLA CKD-EPI: 105 ML/MIN/1.73 M 2
GLOBULIN SER CALC-MCNC: 3.8 G/DL (ref 1.9–3.5)
GLUCOSE SERPL-MCNC: 132 MG/DL (ref 65–99)
HDLC SERPL-MCNC: 33 MG/DL
LDLC SERPL CALC-MCNC: 115 MG/DL
POTASSIUM SERPL-SCNC: 4.2 MMOL/L (ref 3.6–5.5)
PROT SERPL-MCNC: 8.4 G/DL (ref 6–8.2)
SODIUM SERPL-SCNC: 138 MMOL/L (ref 135–145)
TRIGL SERPL-MCNC: 110 MG/DL (ref 0–149)

## 2023-03-28 PROCEDURE — 82306 VITAMIN D 25 HYDROXY: CPT

## 2023-03-28 PROCEDURE — 36415 COLL VENOUS BLD VENIPUNCTURE: CPT

## 2023-03-28 PROCEDURE — 80061 LIPID PANEL: CPT

## 2023-03-28 PROCEDURE — 80053 COMPREHEN METABOLIC PANEL: CPT
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
## 2023-04-04 ENCOUNTER — OFFICE VISIT (OUTPATIENT)
Dept: ENDOCRINOLOGY | Facility: MEDICAL CENTER | Age: 53
End: 2023-04-04
Attending: INTERNAL MEDICINE
Payer: COMMERCIAL

## 2023-04-04 VITALS
HEIGHT: 61 IN | OXYGEN SATURATION: 99 % | DIASTOLIC BLOOD PRESSURE: 82 MMHG | SYSTOLIC BLOOD PRESSURE: 128 MMHG | HEART RATE: 67 BPM | BODY MASS INDEX: 27.98 KG/M2 | WEIGHT: 148.2 LBS

## 2023-04-04 DIAGNOSIS — E55.9 VITAMIN D DEFICIENCY: ICD-10-CM

## 2023-04-04 DIAGNOSIS — E11.9 CONTROLLED TYPE 2 DIABETES MELLITUS WITHOUT COMPLICATION, WITHOUT LONG-TERM CURRENT USE OF INSULIN (HCC): ICD-10-CM

## 2023-04-04 DIAGNOSIS — E78.5 DYSLIPIDEMIA: ICD-10-CM

## 2023-04-04 DIAGNOSIS — E11.9 TYPE 2 DIABETES MELLITUS WITHOUT COMPLICATION, WITHOUT LONG-TERM CURRENT USE OF INSULIN (HCC): ICD-10-CM

## 2023-04-04 DIAGNOSIS — Z79.84 LONG TERM (CURRENT) USE OF ORAL HYPOGLYCEMIC DRUGS: ICD-10-CM

## 2023-04-04 DIAGNOSIS — R74.8 ELEVATED LIVER ENZYMES: ICD-10-CM

## 2023-04-04 LAB
HBA1C MFR BLD: 6.8 % (ref ?–5.8)
POCT INT CON NEG: NEGATIVE
POCT INT CON POS: POSITIVE

## 2023-04-04 PROCEDURE — 83036 HEMOGLOBIN GLYCOSYLATED A1C: CPT | Performed by: INTERNAL MEDICINE

## 2023-04-04 PROCEDURE — 99211 OFF/OP EST MAY X REQ PHY/QHP: CPT | Performed by: INTERNAL MEDICINE

## 2023-04-04 PROCEDURE — 99214 OFFICE O/P EST MOD 30 MIN: CPT | Performed by: INTERNAL MEDICINE

## 2023-04-04 RX ORDER — ROSUVASTATIN CALCIUM 10 MG/1
10 TABLET, COATED ORAL EVERY EVENING
Qty: 30 TABLET | Refills: 11 | Status: SHIPPED | OUTPATIENT
Start: 2023-04-04

## 2023-04-04 RX ORDER — EMPAGLIFLOZIN, METFORMIN HYDROCHLORIDE 25; 1000 MG/1; MG/1
1 TABLET, EXTENDED RELEASE ORAL DAILY
Qty: 90 TABLET | Refills: 1 | Status: SHIPPED | OUTPATIENT
Start: 2023-04-04 | End: 2023-11-30

## 2023-04-04 ASSESSMENT — FIBROSIS 4 INDEX: FIB4 SCORE: 1.01

## 2023-04-04 ASSESSMENT — PATIENT HEALTH QUESTIONNAIRE - PHQ9: CLINICAL INTERPRETATION OF PHQ2 SCORE: 0

## 2023-04-05 NOTE — PROGRESS NOTES
CHIEF COMPLAINT: Patient is here for follow up of Type 2 Diabetes Mellitus    HPI:     Ayaka Torres Rai is a 52 y.o. female with Type 2 Diabetes Mellitus here for follow up.    Labs from April 4, 2023 show A1c is 6.8%  Labs from April 19, 2022 show A1c was 6.7%  Labs from 12/7/2021 show HbA1c was 6.8%    She was previously seen by the PA   She has had type 2 diabetes since 3043-8047      She is controlled on monotherapy   Synjardy XR 25/1000 mg daily      She denies side effects with her medications  Last visit I tried to prescribe a GLP-1 analog but insurance denied approval of Ozempic because the patient's A1c was well controlled    We did discuss today the possibility of trying to appeal the denial for Ozempic because the patient has elevated liver enzymes which could be suggestive of nonalcoholic fatty liver disease.  I explained to the patient that Ozempic treatment can help nonalcoholic fatty liver disease  Her recent labs showed an AST of 39 and ALT of 58 and alkaline phosphatase of 109      She has hyperlipidemia and is on simvastatin 10 mg daily  She previously trialed and failed atorvastatin because of muscle aches  Her LDL cholesterol was 115 on March 20, 2023      She does not have albuminuria or diabetic kidney disease  We do not have an updated urine microalbumin  U ACR was less than 30 on May 2022  U ACR was less than 30 on April 2021      She denies peripheral neuropathy symptoms  She denies foot sores      Eye exam was completed today on April 4, 2023      She is also taking vitamin D3 2000 IU daily  Her vitamin D was adequate at 55 on March 2023          BG Diary:  Patient is not required to check her sugars    Weight has been stable    Diabetes Complications   Retinopathy: No known retinopathy.  Last eye exam: Point-of-care eye exam was completed today  Neuropathy: Denies paresthesias or numbness in hands or feet. Denies any foot wounds.  Exercise: Minimal.  Diet: Fair.  Patient's medications,  "allergies, and social histories were reviewed and updated as appropriate.    ROS:     CONS:     No fever, no chills   EYES:     No diplopia, no blurry vision   CV:           No chest pain, no palpitations   PULM:     No SOB, no cough, no hemoptysis.   GI:            No nausea, no vomiting, no diarrhea, no constipation   ENDO:     No polyuria, no polydipsia, no heat intolerance, no cold intolerance       Past Medical History:  Problem List:  2022-05: Eczema of face  2021-12: Long term (current) use of oral hypoglycemic drugs  2021-04: Restless legs  2020-10: Elevated blood pressure reading without diagnosis of   hypertension  2020-10: Hot flashes  2020-05: Vitamin D deficiency  2019-04: Type 2 diabetes mellitus without complication, without long-  term current use of insulin ([facility])  2019-02: Obesity (BMI 30-39.9)  2014-03: Dyslipidemia  2014-03: Allergic rhinitis due to allergen  Diabetes mellitus, type 2 ([facility])      Past Surgical History:  No past surgical history on file.     Allergies:  Patient has no known allergies.     Social History:  Social History     Tobacco Use    Smoking status: Never    Smokeless tobacco: Never   Vaping Use    Vaping Use: Never used   Substance Use Topics    Alcohol use: No    Drug use: No        Family History:   family history includes Diabetes in her brother, brother, father, and mother; Heart Disease in her father; Hyperlipidemia in her father and mother; Hypertension in her father; No Known Problems in her brother, brother, daughter, daughter, and son; Stroke in her father; Thyroid in her sister.      PHYSICAL EXAM:   OBJECTIVE:  Vital signs: /82 (BP Location: Right arm, Patient Position: Sitting)   Pulse 67   Ht 1.549 m (5' 1\")   Wt 67.2 kg (148 lb 3.2 oz)   SpO2 99%   BMI 28.00 kg/m²   GENERAL: Well-developed, well-nourished in no apparent distress.   EYE:  No ocular asymmetry, PERRLA  HENT: Pink, moist mucous membranes.    NECK: No thyromegaly.   CARDIOVASCULAR:  No " murmurs  LUNGS: Clear breath sounds  ABDOMEN: Soft, nontender   EXTREMITIES: No clubbing, cyanosis, or edema.   NEUROLOGICAL: No gross focal motor abnormalities   LYMPH: No cervical adenopathy palpated.   SKIN: No rashes, lesions.       Labs:  Lab Results   Component Value Date/Time    HBA1C 6.8 (A) 04/04/2023 02:42 PM        Lab Results   Component Value Date/Time    WBC 8.6 04/13/2021 11:22 AM    RBC 5.13 04/13/2021 11:22 AM    HEMOGLOBIN 14.7 04/13/2021 11:22 AM    MCV 90.8 04/13/2021 11:22 AM    MCH 28.7 04/13/2021 11:22 AM    MCHC 31.5 (L) 04/13/2021 11:22 AM    RDW 42.9 04/13/2021 11:22 AM    MPV 12.2 04/13/2021 11:22 AM       Lab Results   Component Value Date/Time    SODIUM 138 03/28/2023 11:13 AM    POTASSIUM 4.2 03/28/2023 11:13 AM    CHLORIDE 101 03/28/2023 11:13 AM    CO2 22 03/28/2023 11:13 AM    ANION 15.0 03/28/2023 11:13 AM    GLUCOSE 132 (H) 03/28/2023 11:13 AM    BUN 14 03/28/2023 11:13 AM    CREATININE 0.65 03/28/2023 11:13 AM    CREATININE 0.8 07/13/2006 02:35 PM    CALCIUM 10.2 03/28/2023 11:13 AM    ASTSGOT 39 03/28/2023 11:13 AM    ALTSGPT 58 (H) 03/28/2023 11:13 AM    TBILIRUBIN 0.3 03/28/2023 11:13 AM    ALBUMIN 4.6 03/28/2023 11:13 AM    TOTPROTEIN 8.4 (H) 03/28/2023 11:13 AM    GLOBULIN 3.8 (H) 03/28/2023 11:13 AM    AGRATIO 1.2 03/28/2023 11:13 AM       Lab Results   Component Value Date/Time    CHOLSTRLTOT 130 08/13/2021 0642    TRIGLYCERIDE 104 08/13/2021 0642    HDL 34 (A) 08/13/2021 0642    LDL 75 08/13/2021 0642       Lab Results   Component Value Date/Time    MALBCRT see below 05/31/2022 09:40 AM    MICROALBUR <1.2 05/31/2022 09:40 AM        Lab Results   Component Value Date/Time    TSHULTRASEN 1.370 02/02/2017 0944     No results found for: FREEDIR  No results found for: FREET3  No results found for: THYSTIMIG        ASSESSMENT/PLAN:     1. Controlled type 2 diabetes mellitus without complication, without long-term current use of insulin (HCC)  Controlled  She is controlled  I am  concerned about nonalcoholic fatty liver disease   and I want her to complete a liver ultrasound  I am also going to get a CBC and urine microalbumin  The CBC will help me calculate her AST platelet index to check for fibrosis  If her ultrasound shows evidence of fatty liver disease I will try to appeal the denial for Ozempic  Continue Synjardy 25/1000 mg daily  We will see her again in 6 months a repeat of her A1c    2. Dyslipidemia  Controlled  Stop simvastatin replaced with Crestor  Repeat fasting lipids in 6 months      3. Vitamin D deficiency  Stable   Vitamin D labs were reviewed with patient  Continue current supplements  Continue monitoring levels       4. Long term (current) use of oral hypoglycemic drugs  Patient is on oral hypoglycemic agents for type 2 diabetes management      Return in about 6 months (around 10/4/2023).      Thank you kindly for allowing me to participate in the diabetes care plan for this patient.    Ricco Clarke MD, FACE, [facility]      CC:   JATIN Mayer

## 2023-04-24 ENCOUNTER — HOSPITAL ENCOUNTER (OUTPATIENT)
Dept: RADIOLOGY | Facility: MEDICAL CENTER | Age: 53
End: 2023-04-24
Attending: INTERNAL MEDICINE
Payer: COMMERCIAL

## 2023-04-24 DIAGNOSIS — R74.8 ELEVATED LIVER ENZYMES: ICD-10-CM

## 2023-04-24 PROCEDURE — 76705 ECHO EXAM OF ABDOMEN: CPT

## 2023-04-26 ENCOUNTER — TELEPHONE (OUTPATIENT)
Dept: ENDOCRINOLOGY | Facility: MEDICAL CENTER | Age: 53
End: 2023-04-26
Payer: COMMERCIAL

## 2023-04-26 NOTE — TELEPHONE ENCOUNTER
New PA request from liaison for Ozempic 0.25-0.5mg/dose 2mg/3ml Sol Pen-inj    #3mls for 28 days    Ran Test Claim- PA required    Faxed PA form to [phone]    Attached chart notes and answered clinical questions.    Waiting on Plan Response    Documented in TRX, MSOT, and Epic.

## 2023-07-07 ENCOUNTER — TELEPHONE (OUTPATIENT)
Dept: ENDOCRINOLOGY | Facility: MEDICAL CENTER | Age: 53
End: 2023-07-07
Payer: COMMERCIAL

## 2023-07-07 NOTE — TELEPHONE ENCOUNTER
Previous encounter, requesting PA for Ozempic started on 4/26/23. Resquested update regarding referral. Called patients  to inform him.  Advised we will reach out once medication is approved or denied.

## 2023-07-07 NOTE — TELEPHONE ENCOUNTER
VOICEMAIL  1. Caller Name: Gorge, spouse                       Call Back Number: [phone]    2. Message: Patients  called in on 7/6/2023, he states that his wife last saw Dr. Clarke, and was waiting to hear back on the ozempic medication. Requesting a call back.     3. Patient approves office to leave a detailed voicemail/MyChart message: yes

## 2023-08-24 ENCOUNTER — PATIENT MESSAGE (OUTPATIENT)
Dept: HEALTH INFORMATION MANAGEMENT | Facility: OTHER | Age: 53
End: 2023-08-24

## 2023-10-03 ENCOUNTER — HOSPITAL ENCOUNTER (OUTPATIENT)
Dept: LAB | Facility: MEDICAL CENTER | Age: 53
End: 2023-10-03
Attending: INTERNAL MEDICINE
Payer: COMMERCIAL

## 2023-10-03 DIAGNOSIS — E55.9 VITAMIN D DEFICIENCY: ICD-10-CM

## 2023-10-03 DIAGNOSIS — E11.9 CONTROLLED TYPE 2 DIABETES MELLITUS WITHOUT COMPLICATION, WITHOUT LONG-TERM CURRENT USE OF INSULIN (HCC): ICD-10-CM

## 2023-10-03 DIAGNOSIS — Z79.84 LONG TERM (CURRENT) USE OF ORAL HYPOGLYCEMIC DRUGS: ICD-10-CM

## 2023-10-03 DIAGNOSIS — R74.8 ELEVATED LIVER ENZYMES: ICD-10-CM

## 2023-10-03 DIAGNOSIS — E78.5 DYSLIPIDEMIA: ICD-10-CM

## 2023-10-03 LAB
25(OH)D3 SERPL-MCNC: 46 NG/ML (ref 30–100)
ALBUMIN SERPL BCP-MCNC: 4.6 G/DL (ref 3.2–4.9)
ALBUMIN/GLOB SERPL: 1.4 G/DL
ALP SERPL-CCNC: 114 U/L (ref 30–99)
ALT SERPL-CCNC: 76 U/L (ref 2–50)
ANION GAP SERPL CALC-SCNC: 12 MMOL/L (ref 7–16)
AST SERPL-CCNC: 54 U/L (ref 12–45)
BASOPHILS # BLD AUTO: 0.8 % (ref 0–1.8)
BASOPHILS # BLD: 0.09 K/UL (ref 0–0.12)
BILIRUB SERPL-MCNC: 0.4 MG/DL (ref 0.1–1.5)
BUN SERPL-MCNC: 15 MG/DL (ref 8–22)
CALCIUM ALBUM COR SERPL-MCNC: 9.5 MG/DL (ref 8.5–10.5)
CALCIUM SERPL-MCNC: 10 MG/DL (ref 8.5–10.5)
CHLORIDE SERPL-SCNC: 104 MMOL/L (ref 96–112)
CHOLEST SERPL-MCNC: 127 MG/DL (ref 100–199)
CO2 SERPL-SCNC: 24 MMOL/L (ref 20–33)
CREAT SERPL-MCNC: 0.68 MG/DL (ref 0.5–1.4)
CREAT UR-MCNC: 30.01 MG/DL
EOSINOPHIL # BLD AUTO: 0.16 K/UL (ref 0–0.51)
EOSINOPHIL NFR BLD: 1.4 % (ref 0–6.9)
ERYTHROCYTE [DISTWIDTH] IN BLOOD BY AUTOMATED COUNT: 43.5 FL (ref 35.9–50)
FASTING STATUS PATIENT QL REPORTED: NORMAL
GFR SERPLBLD CREATININE-BSD FMLA CKD-EPI: 104 ML/MIN/1.73 M 2
GLOBULIN SER CALC-MCNC: 3.4 G/DL (ref 1.9–3.5)
GLUCOSE SERPL-MCNC: 184 MG/DL (ref 65–99)
HCT VFR BLD AUTO: 47.1 % (ref 37–47)
HDLC SERPL-MCNC: 29 MG/DL
HGB BLD-MCNC: 14.4 G/DL (ref 12–16)
IMM GRANULOCYTES # BLD AUTO: 0.02 K/UL (ref 0–0.11)
IMM GRANULOCYTES NFR BLD AUTO: 0.2 % (ref 0–0.9)
LDLC SERPL CALC-MCNC: 67 MG/DL
LYMPHOCYTES # BLD AUTO: 3.82 K/UL (ref 1–4.8)
LYMPHOCYTES NFR BLD: 34.5 % (ref 22–41)
MCH RBC QN AUTO: 27.5 PG (ref 27–33)
MCHC RBC AUTO-ENTMCNC: 30.6 G/DL (ref 32.2–35.5)
MCV RBC AUTO: 89.9 FL (ref 81.4–97.8)
MICROALBUMIN UR-MCNC: <1.2 MG/DL
MICROALBUMIN/CREAT UR: NORMAL MG/G (ref 0–30)
MONOCYTES # BLD AUTO: 0.48 K/UL (ref 0–0.85)
MONOCYTES NFR BLD AUTO: 4.3 % (ref 0–13.4)
NEUTROPHILS # BLD AUTO: 6.5 K/UL (ref 1.82–7.42)
NEUTROPHILS NFR BLD: 58.8 % (ref 44–72)
NRBC # BLD AUTO: 0 K/UL
NRBC BLD-RTO: 0 /100 WBC (ref 0–0.2)
PLATELET # BLD AUTO: 270 K/UL (ref 164–446)
PMV BLD AUTO: 12.8 FL (ref 9–12.9)
POTASSIUM SERPL-SCNC: 4.5 MMOL/L (ref 3.6–5.5)
PROT SERPL-MCNC: 8 G/DL (ref 6–8.2)
RBC # BLD AUTO: 5.24 M/UL (ref 4.2–5.4)
SODIUM SERPL-SCNC: 140 MMOL/L (ref 135–145)
TRIGL SERPL-MCNC: 155 MG/DL (ref 0–149)
WBC # BLD AUTO: 11.1 K/UL (ref 4.8–10.8)

## 2023-10-03 PROCEDURE — 82306 VITAMIN D 25 HYDROXY: CPT

## 2023-10-03 PROCEDURE — 82043 UR ALBUMIN QUANTITATIVE: CPT

## 2023-10-03 PROCEDURE — 85025 COMPLETE CBC W/AUTO DIFF WBC: CPT

## 2023-10-03 PROCEDURE — 82570 ASSAY OF URINE CREATININE: CPT

## 2023-10-03 PROCEDURE — 80061 LIPID PANEL: CPT

## 2023-10-03 PROCEDURE — 36415 COLL VENOUS BLD VENIPUNCTURE: CPT

## 2023-10-03 PROCEDURE — 80053 COMPREHEN METABOLIC PANEL: CPT

## 2023-10-16 ENCOUNTER — OFFICE VISIT (OUTPATIENT)
Dept: ENDOCRINOLOGY | Facility: MEDICAL CENTER | Age: 53
End: 2023-10-16
Attending: INTERNAL MEDICINE
Payer: COMMERCIAL

## 2023-10-16 VITALS
OXYGEN SATURATION: 99 % | BODY MASS INDEX: 28.22 KG/M2 | HEART RATE: 96 BPM | WEIGHT: 149.5 LBS | DIASTOLIC BLOOD PRESSURE: 84 MMHG | SYSTOLIC BLOOD PRESSURE: 122 MMHG | RESPIRATION RATE: 20 BRPM | HEIGHT: 61 IN

## 2023-10-16 DIAGNOSIS — E11.9 TYPE 2 DIABETES MELLITUS WITHOUT COMPLICATION, WITHOUT LONG-TERM CURRENT USE OF INSULIN (HCC): ICD-10-CM

## 2023-10-16 DIAGNOSIS — E11.9 CONTROLLED TYPE 2 DIABETES MELLITUS WITHOUT COMPLICATION, WITHOUT LONG-TERM CURRENT USE OF INSULIN (HCC): ICD-10-CM

## 2023-10-16 DIAGNOSIS — E55.9 VITAMIN D DEFICIENCY: ICD-10-CM

## 2023-10-16 DIAGNOSIS — Z79.84 LONG TERM (CURRENT) USE OF ORAL HYPOGLYCEMIC DRUGS: ICD-10-CM

## 2023-10-16 DIAGNOSIS — E78.5 DYSLIPIDEMIA: ICD-10-CM

## 2023-10-16 LAB
HBA1C MFR BLD: 7.7 % (ref ?–5.8)
POCT INT CON NEG: NEGATIVE
POCT INT CON POS: POSITIVE

## 2023-10-16 PROCEDURE — 83036 HEMOGLOBIN GLYCOSYLATED A1C: CPT | Performed by: INTERNAL MEDICINE

## 2023-10-16 PROCEDURE — 3079F DIAST BP 80-89 MM HG: CPT | Performed by: INTERNAL MEDICINE

## 2023-10-16 PROCEDURE — 99212 OFFICE O/P EST SF 10 MIN: CPT | Performed by: INTERNAL MEDICINE

## 2023-10-16 PROCEDURE — 99214 OFFICE O/P EST MOD 30 MIN: CPT | Performed by: INTERNAL MEDICINE

## 2023-10-16 PROCEDURE — 3074F SYST BP LT 130 MM HG: CPT | Performed by: INTERNAL MEDICINE

## 2023-10-16 RX ORDER — SEMAGLUTIDE 0.68 MG/ML
0.5 INJECTION, SOLUTION SUBCUTANEOUS
Qty: 3 ML | Refills: 6 | Status: SHIPPED | OUTPATIENT
Start: 2023-10-16

## 2023-10-16 RX ORDER — PEN NEEDLE, DIABETIC 32GX 5/32"
1 NEEDLE, DISPOSABLE MISCELLANEOUS
Qty: 30 EACH | Refills: 6 | Status: SHIPPED | OUTPATIENT
Start: 2023-10-16

## 2023-10-16 ASSESSMENT — FIBROSIS 4 INDEX: FIB4 SCORE: 1.22

## 2023-10-16 NOTE — PROGRESS NOTES
CHIEF COMPLAINT: Patient is here for follow up of Type 2 Diabetes Mellitus    HPI:     Ayaka Torres Rai is a 53 y.o. female with Type 2 Diabetes Mellitus here for follow up.    Labs from 10/16/2023 show a1c is 7.7%  Labs from April 4, 2023 show A1c is 6.8%  Labs from April 19, 2022 show A1c was 6.7%  Labs from 12/7/2021 show HbA1c was 6.8%    She was previously seen by the PA   She has had type 2 diabetes since [id number]      She is controlled on monotherapy   Synjardy XR 25/1000 mg daily      She denies side effects with her medications  She was diagnosed with fatty liver diease last visit       She has hyperlipidemia and is on simvastatin 10 mg daily  She previously trialed and failed atorvastatin because of muscle aches  She is on crestor 5mg  She still reports muscle cramps with both thighs   Latest Reference Range & Units 10/03/23 09:18   Cholesterol,Tot 100 - 199 mg/dL 127   Triglycerides 0 - 149 mg/dL 155 (H)   HDL >=40 mg/dL 29 !   LDL <100 mg/dL 67       She does not have albuminuria or diabetic kidney disease   Latest Reference Range & Units 10/03/23 09:31   Micro Alb Creat Ratio 0 - 30 mg/g see below   Creatinine, Urine mg/dL 30.01   Microalbumin, Urine Random mg/dL <1.2         She denies peripheral neuropathy symptoms  She denies foot sores      Eye exam was completed today on April 4, 2023      She is also taking vitamin D3 2000 IU daily  Her vitamin D was adequate at 55 on March 2023          BG Diary:  Patient is not required to check her sugars    Weight has been stable    Diabetes Complications   Retinopathy: No known retinopathy.  Last eye exam: Point-of-care eye exam was completed today  Neuropathy: Denies paresthesias or numbness in hands or feet. Denies any foot wounds.  Exercise: Minimal.  Diet: Fair.  Patient's medications, allergies, and social histories were reviewed and updated as appropriate.    ROS:     CONS:     No fever, no chills   EYES:     No diplopia, no blurry vision   CV:          "  No chest pain, no palpitations   PULM:     No SOB, no cough, no hemoptysis.   GI:            No nausea, no vomiting, no diarrhea, no constipation   ENDO:     No polyuria, no polydipsia, no heat intolerance, no cold intolerance       Past Medical History:  Problem List:  2022-05: Eczema of face  2021-12: Long term (current) use of oral hypoglycemic drugs  2021-04: Restless legs  2020-10: Elevated blood pressure reading without diagnosis of   hypertension  2020-10: Hot flashes  2020-05: Vitamin D deficiency  2019-04: Type 2 diabetes mellitus without complication, without long-  term current use of insulin ([facility])  2019-02: Obesity (BMI 30-39.9)  2014-03: Dyslipidemia  2014-03: Allergic rhinitis due to allergen  Diabetes mellitus, type 2 ([facility])      Past Surgical History:  No past surgical history on file.     Allergies:  Patient has no known allergies.     Social History:  Social History     Tobacco Use    Smoking status: Never    Smokeless tobacco: Never   Vaping Use    Vaping Use: Never used   Substance Use Topics    Alcohol use: No    Drug use: No        Family History:   family history includes Diabetes in her brother, brother, father, and mother; Heart Disease in her father; Hyperlipidemia in her father and mother; Hypertension in her father; No Known Problems in her brother, brother, daughter, daughter, and son; Stroke in her father; Thyroid in her sister.      PHYSICAL EXAM:   OBJECTIVE:  Vital signs: /84 (BP Location: Left arm, Patient Position: Sitting, BP Cuff Size: Adult)   Pulse 96   Resp 20   Ht 1.549 m (5' 1\")   Wt 67.8 kg (149 lb 8 oz)   SpO2 99%   BMI 28.25 kg/m²   GENERAL: Well-developed, well-nourished in no apparent distress.   EYE:  No ocular asymmetry, PERRLA  HENT: Pink, moist mucous membranes.    NECK: No thyromegaly.   CARDIOVASCULAR:  No murmurs  LUNGS: Clear breath sounds  ABDOMEN: Soft, nontender   EXTREMITIES: No clubbing, cyanosis, or edema.   NEUROLOGICAL: No gross focal motor " abnormalities   LYMPH: No cervical adenopathy palpated.   SKIN: No rashes, lesions.       Labs:  Lab Results   Component Value Date/Time    HBA1C 6.8 (A) 04/04/2023 02:42 PM        Lab Results   Component Value Date/Time    WBC 11.1 (H) 10/03/2023 09:18 AM    RBC 5.24 10/03/2023 09:18 AM    HEMOGLOBIN 14.4 10/03/2023 09:18 AM    MCV 89.9 10/03/2023 09:18 AM    MCH 27.5 10/03/2023 09:18 AM    MCHC 30.6 (L) 10/03/2023 09:18 AM    RDW 43.5 10/03/2023 09:18 AM    MPV 12.8 10/03/2023 09:18 AM       Lab Results   Component Value Date/Time    SODIUM 140 10/03/2023 09:18 AM    POTASSIUM 4.5 10/03/2023 09:18 AM    CHLORIDE 104 10/03/2023 09:18 AM    CO2 24 10/03/2023 09:18 AM    ANION 12.0 10/03/2023 09:18 AM    GLUCOSE 184 (H) 10/03/2023 09:18 AM    BUN 15 10/03/2023 09:18 AM    CREATININE 0.68 10/03/2023 09:18 AM    CREATININE 0.8 07/13/2006 02:35 PM    CALCIUM 10.0 10/03/2023 09:18 AM    ASTSGOT 54 (H) 10/03/2023 09:18 AM    ALTSGPT 76 (H) 10/03/2023 09:18 AM    TBILIRUBIN 0.4 10/03/2023 09:18 AM    ALBUMIN 4.6 10/03/2023 09:18 AM    TOTPROTEIN 8.0 10/03/2023 09:18 AM    GLOBULIN 3.4 10/03/2023 09:18 AM    AGRATIO 1.4 10/03/2023 09:18 AM       Lab Results   Component Value Date/Time    CHOLSTRLTOT 130 08/13/2021 0642    TRIGLYCERIDE 104 08/13/2021 0642    HDL 34 (A) 08/13/2021 0642    LDL 75 08/13/2021 0642       Lab Results   Component Value Date/Time    MALBCRT see below 10/03/2023 09:31 AM    MICROALBUR <1.2 10/03/2023 09:31 AM        Lab Results   Component Value Date/Time    TSHZHAO 1.370 02/02/2017 0944     No results found for: FREEDIR  No results found for: FREET3  No results found for: THYSTIMIG        ASSESSMENT/PLAN:     1. Type 2 diabetes mellitus without complication, without long-term current use of insulin (HCC)  Uncontrolled  Start ozempic  Reviewed SE and proper admin of medication   Continue Synjardy 25/1000 mg daily  We will see her again in 4 months a repeat of her A1c    2.  Dyslipidemia  Controlled  Take crestor QOD instead of daily due to cramps  Repeat fasting lipids in 6 months    3. Vitamin D deficiency  Stable   Vitamin D labs were reviewed with patient  Continue current supplements  Continue monitoring levels     4. Long term (current) use of oral hypoglycemic drugs  Patient is on oral hypoglycemic agents for type 2 diabetes management      Return in about 4 months (around 2/16/2024).      Thank you kindly for allowing me to participate in the diabetes care plan for this patient.    Ricco Clarke MD, [facility], [facility]      CC:   JATIN Mayer

## 2023-11-29 DIAGNOSIS — E11.9 CONTROLLED TYPE 2 DIABETES MELLITUS WITHOUT COMPLICATION, WITHOUT LONG-TERM CURRENT USE OF INSULIN (HCC): ICD-10-CM

## 2023-11-30 RX ORDER — EMPAGLIFLOZIN, METFORMIN HYDROCHLORIDE 25; 1000 MG/1; MG/1
1 TABLET, EXTENDED RELEASE ORAL DAILY
Qty: 90 TABLET | Refills: 0 | Status: SHIPPED | OUTPATIENT
Start: 2023-11-30 | End: 2023-12-03

## 2023-12-02 DIAGNOSIS — E11.9 CONTROLLED TYPE 2 DIABETES MELLITUS WITHOUT COMPLICATION, WITHOUT LONG-TERM CURRENT USE OF INSULIN (HCC): ICD-10-CM

## 2023-12-03 RX ORDER — EMPAGLIFLOZIN, METFORMIN HYDROCHLORIDE 25; 1000 MG/1; MG/1
1 TABLET, EXTENDED RELEASE ORAL DAILY
Qty: 90 TABLET | Refills: 0 | Status: SHIPPED | OUTPATIENT
Start: 2023-12-03 | End: 2023-12-04 | Stop reason: SDUPTHER

## 2023-12-04 DIAGNOSIS — E11.9 CONTROLLED TYPE 2 DIABETES MELLITUS WITHOUT COMPLICATION, WITHOUT LONG-TERM CURRENT USE OF INSULIN (HCC): ICD-10-CM

## 2023-12-04 RX ORDER — EMPAGLIFLOZIN, METFORMIN HYDROCHLORIDE 25; 1000 MG/1; MG/1
1 TABLET, EXTENDED RELEASE ORAL DAILY
Qty: 90 TABLET | Refills: 1 | Status: SHIPPED | OUTPATIENT
Start: 2023-12-04

## 2024-02-06 ENCOUNTER — HOSPITAL ENCOUNTER (OUTPATIENT)
Dept: LAB | Facility: MEDICAL CENTER | Age: 54
End: 2024-02-06
Attending: INTERNAL MEDICINE
Payer: COMMERCIAL

## 2024-02-06 DIAGNOSIS — Z79.84 LONG TERM (CURRENT) USE OF ORAL HYPOGLYCEMIC DRUGS: ICD-10-CM

## 2024-02-06 DIAGNOSIS — E11.9 TYPE 2 DIABETES MELLITUS WITHOUT COMPLICATION, WITHOUT LONG-TERM CURRENT USE OF INSULIN (HCC): ICD-10-CM

## 2024-02-06 DIAGNOSIS — E78.5 DYSLIPIDEMIA: ICD-10-CM

## 2024-02-06 DIAGNOSIS — E55.9 VITAMIN D DEFICIENCY: ICD-10-CM

## 2024-02-06 PROCEDURE — 80061 LIPID PANEL: CPT

## 2024-02-06 PROCEDURE — 84443 ASSAY THYROID STIM HORMONE: CPT

## 2024-02-06 PROCEDURE — 82570 ASSAY OF URINE CREATININE: CPT

## 2024-02-06 PROCEDURE — 80053 COMPREHEN METABOLIC PANEL: CPT

## 2024-02-06 PROCEDURE — 82306 VITAMIN D 25 HYDROXY: CPT

## 2024-02-06 PROCEDURE — 36415 COLL VENOUS BLD VENIPUNCTURE: CPT

## 2024-02-06 PROCEDURE — 82043 UR ALBUMIN QUANTITATIVE: CPT

## 2024-02-06 PROCEDURE — 84439 ASSAY OF FREE THYROXINE: CPT

## 2024-02-07 LAB
25(OH)D3 SERPL-MCNC: 44 NG/ML (ref 30–100)
ALBUMIN SERPL BCP-MCNC: 4.5 G/DL (ref 3.2–4.9)
ALBUMIN/GLOB SERPL: 1.2 G/DL
ALP SERPL-CCNC: 102 U/L (ref 30–99)
ALT SERPL-CCNC: 53 U/L (ref 2–50)
ANION GAP SERPL CALC-SCNC: 14 MMOL/L (ref 7–16)
AST SERPL-CCNC: 42 U/L (ref 12–45)
BILIRUB SERPL-MCNC: 0.4 MG/DL (ref 0.1–1.5)
BUN SERPL-MCNC: 12 MG/DL (ref 8–22)
CALCIUM ALBUM COR SERPL-MCNC: 10 MG/DL (ref 8.5–10.5)
CALCIUM SERPL-MCNC: 10.4 MG/DL (ref 8.5–10.5)
CHLORIDE SERPL-SCNC: 103 MMOL/L (ref 96–112)
CHOLEST SERPL-MCNC: 115 MG/DL (ref 100–199)
CO2 SERPL-SCNC: 22 MMOL/L (ref 20–33)
CREAT SERPL-MCNC: 0.65 MG/DL (ref 0.5–1.4)
CREAT UR-MCNC: 79.06 MG/DL
FASTING STATUS PATIENT QL REPORTED: NORMAL
GFR SERPLBLD CREATININE-BSD FMLA CKD-EPI: 105 ML/MIN/1.73 M 2
GLOBULIN SER CALC-MCNC: 3.9 G/DL (ref 1.9–3.5)
GLUCOSE SERPL-MCNC: 111 MG/DL (ref 65–99)
HDLC SERPL-MCNC: 29 MG/DL
LDLC SERPL CALC-MCNC: 64 MG/DL
MICROALBUMIN UR-MCNC: <1.2 MG/DL
MICROALBUMIN/CREAT UR: NORMAL MG/G (ref 0–30)
POTASSIUM SERPL-SCNC: 4.5 MMOL/L (ref 3.6–5.5)
PROT SERPL-MCNC: 8.4 G/DL (ref 6–8.2)
SODIUM SERPL-SCNC: 139 MMOL/L (ref 135–145)
T4 FREE SERPL-MCNC: 1.37 NG/DL (ref 0.93–1.7)
TRIGL SERPL-MCNC: 109 MG/DL (ref 0–149)
TSH SERPL DL<=0.005 MIU/L-ACNC: 1.79 UIU/ML (ref 0.38–5.33)

## 2024-02-12 ENCOUNTER — NON-PROVIDER VISIT (OUTPATIENT)
Dept: ENDOCRINOLOGY | Facility: MEDICAL CENTER | Age: 54
End: 2024-02-12
Attending: INTERNAL MEDICINE
Payer: COMMERCIAL

## 2024-02-12 VITALS
SYSTOLIC BLOOD PRESSURE: 100 MMHG | HEART RATE: 88 BPM | DIASTOLIC BLOOD PRESSURE: 60 MMHG | WEIGHT: 143 LBS | HEIGHT: 61 IN | OXYGEN SATURATION: 98 % | BODY MASS INDEX: 27 KG/M2

## 2024-02-12 DIAGNOSIS — E11.9 CONTROLLED TYPE 2 DIABETES MELLITUS WITHOUT COMPLICATION, WITHOUT LONG-TERM CURRENT USE OF INSULIN (HCC): ICD-10-CM

## 2024-02-12 LAB
HBA1C MFR BLD: 6.9 % (ref ?–5.8)
POCT INT CON NEG: NEGATIVE
POCT INT CON POS: POSITIVE

## 2024-02-12 PROCEDURE — 99212 OFFICE O/P EST SF 10 MIN: CPT | Performed by: INTERNAL MEDICINE

## 2024-02-12 PROCEDURE — 83036 HEMOGLOBIN GLYCOSYLATED A1C: CPT

## 2024-02-12 ASSESSMENT — FIBROSIS 4 INDEX: FIB4 SCORE: 1.13

## 2024-02-12 NOTE — PROGRESS NOTES
RN-CDE Note    Subjective:   Endocrinology Clinic Progress Note  PCP: JATIN Mayer    HPI:  Ayaka Torres Rai is a 53 y.o. old patient who is seen today by the Diabetes Nurse Specialist for review of Type 2 Diabetes.  Recent changes in health: Fell and has a hair line fracture of her tailbone.  She was in [place] for 7 weeks for daughter's wedding.  DM:   Last A1c:   Lab Results   Component Value Date/Time    HBA1C 6.9 (A) 02/12/2024 01:52 PM      Previous A1c was 7.7 on 10/16/23  A1C GOAL: < 7    Diabetes Medications:   Ozempic  .5 mg weekly  Synjardy  mg daily    Exercise: Walking at work until got hurt  Diet: Vegetarian.  Eating mostly lentils and vegetables.  Patient's body mass index is unknown because there is no height or weight on file. Exercise and nutrition counseling were performed at this visit.    Glucose monitoring frequency: Not testing since has been [facility]    Hypoglycemic episodes: no  Last Retinal Exam: on file and up-to-date  Daily Foot Exam: Yes   Foot Exam:  Monofilament: done  Lab Results   Component Value Date/Time    MALBCRT see below 02/06/2024 10:35 AM    MICROALBUR <1.2 02/06/2024 10:35 AM        ACR Albumin/Creatinine Ratio goal <30     HTN:   Blood pressure goal <130/<80 .   Currently Rx ACE/ARB: Yes     Dyslipidemia:    Lab Results   Component Value Date/Time    CHOLSTRLTOT 115 02/06/2024 10:35 AM    LDL 64 02/06/2024 10:35 AM    HDL 29 (A) 02/06/2024 10:35 AM    TRIGLYCERIDE 109 02/06/2024 10:35 AM         Currently Rx Statin: Yes     She  reports that she has never smoked. She has never used smokeless tobacco.      Plan:     Discussed and educated on:   - All medications, side effects and compliance (discussed carefully)  - Annual eye examinations at Ophthalmology  - Home glucose monitoring emphasized  - Weight control and daily exercise    Recommended medication changes: No changes at this time.  Follow up with Dr. Clarke in 6 months.  No new labs needed.

## 2024-04-20 DIAGNOSIS — E78.5 DYSLIPIDEMIA: ICD-10-CM

## 2024-04-21 RX ORDER — ROSUVASTATIN CALCIUM 10 MG/1
TABLET, COATED ORAL
Qty: 90 TABLET | Refills: 1 | Status: SHIPPED | OUTPATIENT
Start: 2024-04-21

## 2024-06-02 DIAGNOSIS — E11.9 CONTROLLED TYPE 2 DIABETES MELLITUS WITHOUT COMPLICATION, WITHOUT LONG-TERM CURRENT USE OF INSULIN (HCC): ICD-10-CM

## 2024-06-03 RX ORDER — EMPAGLIFLOZIN, METFORMIN HYDROCHLORIDE 25; 1000 MG/1; MG/1
1 TABLET, EXTENDED RELEASE ORAL DAILY
Qty: 90 TABLET | Refills: 0 | Status: SHIPPED | OUTPATIENT
Start: 2024-06-03

## 2024-06-05 DIAGNOSIS — E11.9 TYPE 2 DIABETES MELLITUS WITHOUT COMPLICATION, WITHOUT LONG-TERM CURRENT USE OF INSULIN (HCC): ICD-10-CM

## 2024-06-05 RX ORDER — SEMAGLUTIDE 0.68 MG/ML
0.5 INJECTION, SOLUTION SUBCUTANEOUS
Qty: 3 ML | Refills: 0 | Status: SHIPPED | OUTPATIENT
Start: 2024-06-05

## 2024-07-03 DIAGNOSIS — E11.9 TYPE 2 DIABETES MELLITUS WITHOUT COMPLICATION, WITHOUT LONG-TERM CURRENT USE OF INSULIN (HCC): ICD-10-CM

## 2024-07-03 RX ORDER — SEMAGLUTIDE 0.68 MG/ML
0.5 INJECTION, SOLUTION SUBCUTANEOUS
Qty: 3 ML | Refills: 0 | Status: SHIPPED | OUTPATIENT
Start: 2024-07-03

## 2024-08-27 DIAGNOSIS — E11.9 CONTROLLED TYPE 2 DIABETES MELLITUS WITHOUT COMPLICATION, WITHOUT LONG-TERM CURRENT USE OF INSULIN (HCC): ICD-10-CM

## 2024-08-27 RX ORDER — EMPAGLIFLOZIN, METFORMIN HYDROCHLORIDE 25; 1000 MG/1; MG/1
1 TABLET, EXTENDED RELEASE ORAL DAILY
Qty: 90 TABLET | Refills: 0 | Status: SHIPPED | OUTPATIENT
Start: 2024-08-27

## 2024-09-03 DIAGNOSIS — E11.9 TYPE 2 DIABETES MELLITUS WITHOUT COMPLICATION, WITHOUT LONG-TERM CURRENT USE OF INSULIN (HCC): ICD-10-CM

## 2024-09-03 RX ORDER — SEMAGLUTIDE 0.68 MG/ML
0.5 INJECTION, SOLUTION SUBCUTANEOUS
Qty: 3 ML | Refills: 0 | Status: SHIPPED | OUTPATIENT
Start: 2024-09-03 | End: 2024-09-30

## 2024-09-26 DIAGNOSIS — E11.9 TYPE 2 DIABETES MELLITUS WITHOUT COMPLICATION, WITHOUT LONG-TERM CURRENT USE OF INSULIN (HCC): ICD-10-CM

## 2024-09-30 RX ORDER — SEMAGLUTIDE 0.68 MG/ML
0.5 INJECTION, SOLUTION SUBCUTANEOUS
Qty: 3 ML | Refills: 0 | Status: SHIPPED | OUTPATIENT
Start: 2024-09-30

## 2024-10-07 ENCOUNTER — OFFICE VISIT (OUTPATIENT)
Dept: ENDOCRINOLOGY | Facility: MEDICAL CENTER | Age: 54
End: 2024-10-07
Attending: INTERNAL MEDICINE
Payer: COMMERCIAL

## 2024-10-07 VITALS
SYSTOLIC BLOOD PRESSURE: 120 MMHG | DIASTOLIC BLOOD PRESSURE: 70 MMHG | HEART RATE: 97 BPM | HEIGHT: 61 IN | OXYGEN SATURATION: 98 % | BODY MASS INDEX: 27.38 KG/M2 | WEIGHT: 145 LBS

## 2024-10-07 DIAGNOSIS — E78.5 DYSLIPIDEMIA: ICD-10-CM

## 2024-10-07 DIAGNOSIS — K76.0 NAFLD (NONALCOHOLIC FATTY LIVER DISEASE): ICD-10-CM

## 2024-10-07 DIAGNOSIS — E11.9 CONTROLLED TYPE 2 DIABETES MELLITUS WITHOUT COMPLICATION, WITHOUT LONG-TERM CURRENT USE OF INSULIN (HCC): ICD-10-CM

## 2024-10-07 DIAGNOSIS — E55.9 VITAMIN D DEFICIENCY: ICD-10-CM

## 2024-10-07 DIAGNOSIS — Z79.84 LONG TERM (CURRENT) USE OF ORAL HYPOGLYCEMIC DRUGS: ICD-10-CM

## 2024-10-07 DIAGNOSIS — E11.9 TYPE 2 DIABETES MELLITUS WITHOUT COMPLICATION, WITHOUT LONG-TERM CURRENT USE OF INSULIN (HCC): ICD-10-CM

## 2024-10-07 DIAGNOSIS — R74.8 ELEVATED LIVER ENZYMES: ICD-10-CM

## 2024-10-07 LAB
HBA1C MFR BLD: 6.4 % (ref ?–5.8)
POCT INT CON NEG: NEGATIVE
POCT INT CON POS: POSITIVE

## 2024-10-07 PROCEDURE — 83036 HEMOGLOBIN GLYCOSYLATED A1C: CPT | Performed by: INTERNAL MEDICINE

## 2024-10-07 PROCEDURE — 99212 OFFICE O/P EST SF 10 MIN: CPT | Performed by: INTERNAL MEDICINE

## 2024-10-07 PROCEDURE — 3078F DIAST BP <80 MM HG: CPT | Performed by: INTERNAL MEDICINE

## 2024-10-07 PROCEDURE — 3074F SYST BP LT 130 MM HG: CPT | Performed by: INTERNAL MEDICINE

## 2024-10-07 PROCEDURE — 99215 OFFICE O/P EST HI 40 MIN: CPT | Performed by: INTERNAL MEDICINE

## 2024-10-07 RX ORDER — SEMAGLUTIDE 1.34 MG/ML
1 INJECTION, SOLUTION SUBCUTANEOUS
Qty: 3 ML | Refills: 5 | Status: SHIPPED | OUTPATIENT
Start: 2024-10-07

## 2024-10-07 RX ORDER — EMPAGLIFLOZIN, METFORMIN HYDROCHLORIDE 25; 1000 MG/1; MG/1
1 TABLET, EXTENDED RELEASE ORAL DAILY
Qty: 30 TABLET | Refills: 5 | Status: SHIPPED | OUTPATIENT
Start: 2024-10-07

## 2024-10-07 ASSESSMENT — FIBROSIS 4 INDEX: FIB4 SCORE: 1.15

## 2025-03-31 DIAGNOSIS — E11.9 TYPE 2 DIABETES MELLITUS WITHOUT COMPLICATION, WITHOUT LONG-TERM CURRENT USE OF INSULIN (HCC): ICD-10-CM

## 2025-03-31 RX ORDER — SEMAGLUTIDE 1.34 MG/ML
INJECTION, SOLUTION SUBCUTANEOUS
Qty: 9 ML | Refills: 0 | Status: SHIPPED | OUTPATIENT
Start: 2025-03-31

## 2025-04-22 ENCOUNTER — OFFICE VISIT (OUTPATIENT)
Dept: ENDOCRINOLOGY | Facility: MEDICAL CENTER | Age: 55
End: 2025-04-22
Attending: INTERNAL MEDICINE
Payer: COMMERCIAL

## 2025-04-22 ENCOUNTER — HOSPITAL ENCOUNTER (OUTPATIENT)
Dept: LAB | Facility: MEDICAL CENTER | Age: 55
End: 2025-04-22
Attending: INTERNAL MEDICINE
Payer: COMMERCIAL

## 2025-04-22 VITALS
HEIGHT: 61 IN | SYSTOLIC BLOOD PRESSURE: 118 MMHG | DIASTOLIC BLOOD PRESSURE: 70 MMHG | OXYGEN SATURATION: 95 % | WEIGHT: 142 LBS | HEART RATE: 102 BPM | BODY MASS INDEX: 26.81 KG/M2

## 2025-04-22 DIAGNOSIS — E11.9 TYPE 2 DIABETES MELLITUS WITHOUT COMPLICATION, WITHOUT LONG-TERM CURRENT USE OF INSULIN (HCC): ICD-10-CM

## 2025-04-22 DIAGNOSIS — E78.5 DYSLIPIDEMIA: ICD-10-CM

## 2025-04-22 DIAGNOSIS — E55.9 VITAMIN D DEFICIENCY: ICD-10-CM

## 2025-04-22 DIAGNOSIS — K76.0 NAFLD (NONALCOHOLIC FATTY LIVER DISEASE): ICD-10-CM

## 2025-04-22 DIAGNOSIS — Z79.84 LONG TERM (CURRENT) USE OF ORAL HYPOGLYCEMIC DRUGS: ICD-10-CM

## 2025-04-22 DIAGNOSIS — E11.9 CONTROLLED TYPE 2 DIABETES MELLITUS WITHOUT COMPLICATION, WITHOUT LONG-TERM CURRENT USE OF INSULIN (HCC): ICD-10-CM

## 2025-04-22 DIAGNOSIS — R07.89 CHEST HEAVINESS: ICD-10-CM

## 2025-04-22 DIAGNOSIS — Z78.9 STATIN INTOLERANCE: ICD-10-CM

## 2025-04-22 LAB
25(OH)D3 SERPL-MCNC: 60 NG/ML (ref 30–100)
ALBUMIN SERPL BCP-MCNC: 4 G/DL (ref 3.2–4.9)
ALBUMIN/GLOB SERPL: 1.1 G/DL
ALP SERPL-CCNC: 93 U/L (ref 30–99)
ALT SERPL-CCNC: 46 U/L (ref 2–50)
ANION GAP SERPL CALC-SCNC: 13 MMOL/L (ref 7–16)
AST SERPL-CCNC: 35 U/L (ref 12–45)
BILIRUB SERPL-MCNC: 0.3 MG/DL (ref 0.1–1.5)
BUN SERPL-MCNC: 12 MG/DL (ref 8–22)
CALCIUM ALBUM COR SERPL-MCNC: 9.3 MG/DL (ref 8.5–10.5)
CALCIUM SERPL-MCNC: 9.3 MG/DL (ref 8.5–10.5)
CHLORIDE SERPL-SCNC: 105 MMOL/L (ref 96–112)
CHOLEST SERPL-MCNC: 205 MG/DL (ref 100–199)
CO2 SERPL-SCNC: 22 MMOL/L (ref 20–33)
CREAT SERPL-MCNC: 0.87 MG/DL (ref 0.5–1.4)
CREAT UR-MCNC: 152 MG/DL
FASTING STATUS PATIENT QL REPORTED: NORMAL
GFR SERPLBLD CREATININE-BSD FMLA CKD-EPI: 79 ML/MIN/1.73 M 2
GLOBULIN SER CALC-MCNC: 3.5 G/DL (ref 1.9–3.5)
GLUCOSE SERPL-MCNC: 92 MG/DL (ref 65–99)
HDLC SERPL-MCNC: 32 MG/DL
LDLC SERPL CALC-MCNC: 154 MG/DL
MICROALBUMIN UR-MCNC: <1.2 MG/DL
MICROALBUMIN/CREAT UR: NORMAL MG/G (ref 0–30)
POTASSIUM SERPL-SCNC: 3.8 MMOL/L (ref 3.6–5.5)
PROT SERPL-MCNC: 7.5 G/DL (ref 6–8.2)
SODIUM SERPL-SCNC: 140 MMOL/L (ref 135–145)
T4 FREE SERPL-MCNC: 1.39 NG/DL (ref 0.93–1.7)
TRIGL SERPL-MCNC: 94 MG/DL (ref 0–149)
TSH SERPL-ACNC: 3.92 UIU/ML (ref 0.38–5.33)

## 2025-04-22 PROCEDURE — 3074F SYST BP LT 130 MM HG: CPT | Performed by: INTERNAL MEDICINE

## 2025-04-22 PROCEDURE — 82306 VITAMIN D 25 HYDROXY: CPT

## 2025-04-22 PROCEDURE — 82570 ASSAY OF URINE CREATININE: CPT

## 2025-04-22 PROCEDURE — 36415 COLL VENOUS BLD VENIPUNCTURE: CPT

## 2025-04-22 PROCEDURE — 84439 ASSAY OF FREE THYROXINE: CPT

## 2025-04-22 PROCEDURE — 80053 COMPREHEN METABOLIC PANEL: CPT

## 2025-04-22 PROCEDURE — 3078F DIAST BP <80 MM HG: CPT | Performed by: INTERNAL MEDICINE

## 2025-04-22 PROCEDURE — 99214 OFFICE O/P EST MOD 30 MIN: CPT | Performed by: INTERNAL MEDICINE

## 2025-04-22 PROCEDURE — 80061 LIPID PANEL: CPT

## 2025-04-22 PROCEDURE — 82043 UR ALBUMIN QUANTITATIVE: CPT

## 2025-04-22 PROCEDURE — 84443 ASSAY THYROID STIM HORMONE: CPT

## 2025-04-22 PROCEDURE — 99211 OFF/OP EST MAY X REQ PHY/QHP: CPT | Performed by: INTERNAL MEDICINE

## 2025-04-22 RX ORDER — PRAVASTATIN SODIUM 20 MG
20 TABLET ORAL NIGHTLY
Qty: 100 TABLET | Refills: 3 | Status: SHIPPED | OUTPATIENT
Start: 2025-04-22 | End: 2026-05-27

## 2025-04-22 ASSESSMENT — FIBROSIS 4 INDEX: FIB4 SCORE: 1.15

## 2025-04-22 NOTE — PROGRESS NOTES
CHIEF COMPLAINT: Patient is here for follow up of Type 2 Diabetes Mellitus    HPI:     Ayaka Torres Rai is a 54 y.o. female with Type 2 Diabetes Mellitus here for follow up.    Labs from April 22, 2025 show A1c 6.3%  Labs from October 7, 2024 show A1c 6.4%  Labs from February 12, 2024 show A1c 6.9%  Labs from 10/16/2023 show a1c is 7.7%  Labs from April 4, 2023 show A1c is 6.8%  Labs from April 19, 2022 show A1c was 6.7%  Labs from 12/7/2021 show HbA1c was 6.8%    She was previously seen by the PA   She has had type 2 diabetes since 2910-4024      She is on  Ozempic 1.0 mg weekly  Synjardy XR 25/1000 mg daily      She denies side effects with her medications  She just did her labs this morning and the test results are pending  She is reporting episodes of chest heaviness with exertion which started a couple months ago she does not recall the exact date.  She works at a [employer] and is on her feet all the time      She has hyperlipidemia  She previously trialed and failed atorvastatin because of muscle aches  She previously tried and failed simvastatin due to muscle aches  She was on Crestor but she also had had muscle aches  She stopped taking Crestor a couple months ago  She reports that her muscle aches stopped when she stopped taking Crestor  She denies prior hx of MI, CAD and PAD     Latest Reference Range & Units 02/06/24 10:35   Cholesterol,Tot 100 - 199 mg/dL 115   Triglycerides 0 - 149 mg/dL 109   HDL >=40 mg/dL 29 !   LDL <100 mg/dL 64       She does not have albuminuria or diabetic kidney disease  Her updated urine albumin is pending   Latest Reference Range & Units 02/06/24 10:35   Micro Alb Creat Ratio 0 - 30 mg/g see below   Creatinine, Urine mg/dL 79.06   Microalbumin, Urine Random mg/dL <1.2       She denies peripheral neuropathy symptoms  She denies foot sores      Eye exam was completed today on February 2025      She is also taking vitamin D3 2000 IU daily   Latest Reference Range & Units 02/06/24  10:35   25-Hydroxy   Vitamin D 25 30 - 100 ng/mL 44   TSH 0.380 - 5.330 uIU/mL 1.790   Free T-4 0.93 - 1.70 ng/dL 1.37             BG Diary:  Patient is not required to check her sugars    Weight  has dropped by 3 lbs since last visit     Diabetes Complications   Retinopathy: No known retinopathy.  Last eye exam: February 2025 with eye care professionals  Neuropathy: Denies paresthesias or numbness in hands or feet. Denies any foot wounds.  Exercise: Minimal.  Diet: Fair.  Patient's medications, allergies, and social histories were reviewed and updated as appropriate.    ROS:     CONS:     No fever, no chills   EYES:     No diplopia, no blurry vision   CV:           No chest pain, no palpitations   PULM:     No SOB, no cough, no hemoptysis.   GI:            No nausea, no vomiting, no diarrhea, no constipation   ENDO:     No polyuria, no polydipsia, no heat intolerance, no cold intolerance       Past Medical History:  Problem List:  2024-10: NAFLD (nonalcoholic fatty liver disease)  2022-05: Eczema of face  2021-12: Long term (current) use of oral hypoglycemic drugs  2021-04: Restless legs  2020-10: Elevated blood pressure reading without diagnosis of   hypertension  2020-10: Hot flashes  2020-05: Vitamin D deficiency  2019-04: Type 2 diabetes mellitus without complication, without long-  term current use of insulin (HCC)  2019-02: Obesity (BMI 30-39.9)  2014-03: Dyslipidemia  2014-03: Allergic rhinitis due to allergen  Diabetes mellitus, type 2 (HCC)      Past Surgical History:  History reviewed. No pertinent surgical history.     Allergies:  Patient has no known allergies.     Social History:  Social History     Tobacco Use    Smoking status: Never    Smokeless tobacco: Never   Vaping Use    Vaping status: Never Used   Substance Use Topics    Alcohol use: No    Drug use: No        Family History:   family history includes Diabetes in her brother, brother, father, and mother; Heart Disease in her father;  "Hyperlipidemia in her father and mother; Hypertension in her father; No Known Problems in her brother, brother, daughter, daughter, and son; Stroke in her father; Thyroid in her sister.      PHYSICAL EXAM:   OBJECTIVE:  Vital signs: /70 (BP Location: Left arm, Patient Position: Sitting, BP Cuff Size: Adult long)   Pulse (!) 102   Ht 1.549 m (5' 1\")   Wt 64.4 kg (142 lb)   SpO2 95%   BMI 26.83 kg/m²   GENERAL: Well-developed, well-nourished in no apparent distress.   EYE:  No ocular asymmetry, PERRLA  HENT: Pink, moist mucous membranes.    NECK: No thyromegaly.   CARDIOVASCULAR:  No murmurs  LUNGS: Clear breath sounds  ABDOMEN: Soft, nontender   EXTREMITIES: No clubbing, cyanosis, or edema.   NEUROLOGICAL: No gross focal motor abnormalities   LYMPH: No cervical adenopathy palpated.   SKIN: No rashes, lesions.       Labs:  Lab Results   Component Value Date/Time    HBA1C 6.4 (A) 10/07/2024 05:02 PM        Lab Results   Component Value Date/Time    WBC 11.1 (H) 10/03/2023 09:18 AM    RBC 5.24 10/03/2023 09:18 AM    HEMOGLOBIN 14.4 10/03/2023 09:18 AM    MCV 89.9 10/03/2023 09:18 AM    MCH 27.5 10/03/2023 09:18 AM    MCHC 30.6 (L) 10/03/2023 09:18 AM    RDW 43.5 10/03/2023 09:18 AM    MPV 12.8 10/03/2023 09:18 AM       Lab Results   Component Value Date/Time    SODIUM 139 02/06/2024 10:35 AM    POTASSIUM 4.5 02/06/2024 10:35 AM    CHLORIDE 103 02/06/2024 10:35 AM    CO2 22 02/06/2024 10:35 AM    ANION 14.0 02/06/2024 10:35 AM    GLUCOSE 111 (H) 02/06/2024 10:35 AM    BUN 12 02/06/2024 10:35 AM    CREATININE 0.65 02/06/2024 10:35 AM    CREATININE 0.8 07/13/2006 02:35 PM    CALCIUM 10.4 02/06/2024 10:35 AM    ASTSGOT 42 02/06/2024 10:35 AM    ALTSGPT 53 (H) 02/06/2024 10:35 AM    TBILIRUBIN 0.4 02/06/2024 10:35 AM    ALBUMIN 4.5 02/06/2024 10:35 AM    TOTPROTEIN 8.4 (H) 02/06/2024 10:35 AM    GLOBULIN 3.9 (H) 02/06/2024 10:35 AM    AGRATIO 1.2 02/06/2024 10:35 AM       Lab Results   Component Value Date/Time    " CHOLSTRLTOT 130 08/13/2021 0642    TRIGLYCERIDE 104 08/13/2021 0642    HDL 34 (A) 08/13/2021 0642    LDL 75 08/13/2021 0642       Lab Results   Component Value Date/Time    MALBCRT see below 02/06/2024 10:35 AM    MICROALBUR <1.2 02/06/2024 10:35 AM        Lab Results   Component Value Date/Time    TSHULTRASEN 1.370 02/02/2017 0944     No results found for: FREEDIR  No results found for: FREET3  No results found for: THYSTIMIG        ASSESSMENT/PLAN:     1. Controlled type 2 diabetes mellitus without complication, without long-term current use of insulin (HCC)  Controlled   continue Ozempic and Synjardy  Follow-up in 6 months with labs    2. Dyslipidemia  She has tried and failed multiple statins I am going to try pravastatin 1 last time  Repeat fasting lipids in 6 months  If she cannot tolerate statins we will try Zetia    3. Vitamin D deficiency  Will follow-up on calcium vitamin D levels    4. Long term (current) use of oral hypoglycemic drugs  Patient is on oral agents for diabetes management    5. NAFLD (nonalcoholic fatty liver disease)  Continue monitoring liver enzymes    6. Chest heaviness  Unstable we will schedule for cardiac CT and update her      7. Statin intolerance  Patient has tried and failed atorvastatin, rosuvastatin and simvastatin    1. Type 2 diabetes mellitus without complication, without long-term current use of insulin (HCC)  Controlled  A1c is stable is 6.2%  Continue Ozempic  1mg weekly  Continue Synjardy 25/1000 mg daily  We will see her again in 6 months a repeat of her A1c    2. Dyslipidemia  Controlled  Patient tried and failed Crestor  Will try pravastatin  Will get calcium score due to heaviness   Repeat fasting lipids in 6 months    3. Vitamin D deficiency  Stable   Vitamin D labs were reviewed with patient  Continue current supplements  Continue monitoring levels     4. Long term (current) use of oral hypoglycemic drugs  Patient is on oral hypoglycemic agents for type 2 diabetes  management      6. Elevated liver enzymes  Improved liver enzymes after weight loss continue monitoring levels    7. NAFLD (nonalcoholic fatty liver disease)  This is the direct etiology of her elevated liver enzymes        Return in about 8 months (around 12/22/2025).      Total time spent on day of service was over 60 minutes which included obtaining a detailed history and physical exam, ordering labs, coordinating care and scheduling future follow-up      Thank you kindly for allowing me to participate in the diabetes care plan for this patient.    Ricco Clarke MD, [facility], [facility]      CC:   JATIN Mayer

## 2025-05-06 ENCOUNTER — HOSPITAL ENCOUNTER (OUTPATIENT)
Dept: RADIOLOGY | Facility: MEDICAL CENTER | Age: 55
End: 2025-05-06
Attending: INTERNAL MEDICINE
Payer: COMMERCIAL

## 2025-05-06 DIAGNOSIS — R07.89 CHEST HEAVINESS: ICD-10-CM

## 2025-05-06 DIAGNOSIS — E11.9 CONTROLLED TYPE 2 DIABETES MELLITUS WITHOUT COMPLICATION, WITHOUT LONG-TERM CURRENT USE OF INSULIN (HCC): ICD-10-CM

## 2025-05-06 DIAGNOSIS — E78.5 DYSLIPIDEMIA: ICD-10-CM

## 2025-05-06 PROCEDURE — 4410556 CT-CARDIAC SCORING (SELF PAY ONLY)

## 2025-05-21 DIAGNOSIS — E11.9 TYPE 2 DIABETES MELLITUS WITHOUT COMPLICATION, WITHOUT LONG-TERM CURRENT USE OF INSULIN (HCC): ICD-10-CM

## 2025-05-21 RX ORDER — EMPAGLIFLOZIN, METFORMIN HYDROCHLORIDE 25; 1000 MG/1; MG/1
1 TABLET, EXTENDED RELEASE ORAL DAILY
Qty: 30 TABLET | Refills: 0 | Status: SHIPPED | OUTPATIENT
Start: 2025-05-21

## 2025-06-19 ENCOUNTER — RESULTS FOLLOW-UP (OUTPATIENT)
Dept: ENDOCRINOLOGY | Facility: MEDICAL CENTER | Age: 55
End: 2025-06-19

## 2025-06-21 DIAGNOSIS — E11.9 TYPE 2 DIABETES MELLITUS WITHOUT COMPLICATION, WITHOUT LONG-TERM CURRENT USE OF INSULIN (HCC): ICD-10-CM

## 2025-06-22 RX ORDER — SEMAGLUTIDE 1.34 MG/ML
INJECTION, SOLUTION SUBCUTANEOUS
Qty: 9 ML | Refills: 0 | Status: SHIPPED | OUTPATIENT
Start: 2025-06-22

## 2025-06-23 DIAGNOSIS — E11.9 TYPE 2 DIABETES MELLITUS WITHOUT COMPLICATION, WITHOUT LONG-TERM CURRENT USE OF INSULIN (HCC): ICD-10-CM

## 2025-06-24 RX ORDER — EMPAGLIFLOZIN, METFORMIN HYDROCHLORIDE 25; 1000 MG/1; MG/1
1 TABLET, EXTENDED RELEASE ORAL DAILY
Qty: 90 TABLET | Refills: 3 | Status: SHIPPED | OUTPATIENT
Start: 2025-06-24